# Patient Record
Sex: MALE | Race: WHITE | NOT HISPANIC OR LATINO | Employment: FULL TIME | URBAN - METROPOLITAN AREA
[De-identification: names, ages, dates, MRNs, and addresses within clinical notes are randomized per-mention and may not be internally consistent; named-entity substitution may affect disease eponyms.]

---

## 2018-01-12 ENCOUNTER — TRANSFERRED RECORDS (OUTPATIENT)
Dept: HEALTH INFORMATION MANAGEMENT | Facility: CLINIC | Age: 48
End: 2018-01-12

## 2018-03-23 ENCOUNTER — TRANSFERRED RECORDS (OUTPATIENT)
Dept: HEALTH INFORMATION MANAGEMENT | Facility: CLINIC | Age: 48
End: 2018-03-23

## 2018-07-16 ENCOUNTER — TELEPHONE (OUTPATIENT)
Dept: DERMATOLOGY | Facility: CLINIC | Age: 48
End: 2018-07-16

## 2018-07-16 NOTE — TELEPHONE ENCOUNTER
Dermatology Pre-visit Call:    Reason for visit : psoriasis     Was the patient referred: Yes    If the patient was referred, are records obtained: Will be faxed. (If no, then obtain records).    Has the patient seen a dermatologist in the past: Yes. (If yes, obtain records)    Left voicemail regarding appointment on 7/23/18 at 1830. Call back number provided for questions or rescheduling.

## 2018-07-23 ENCOUNTER — OFFICE VISIT (OUTPATIENT)
Dept: DERMATOLOGY | Facility: CLINIC | Age: 48
End: 2018-07-23
Payer: COMMERCIAL

## 2018-07-23 DIAGNOSIS — L30.9 CHRONIC DERMATITIS OF HANDS: Primary | ICD-10-CM

## 2018-07-23 RX ORDER — PIOGLITAZONEHYDROCHLORIDE 30 MG/1
TABLET ORAL
Refills: 0 | COMMUNITY
Start: 2018-06-05

## 2018-07-23 RX ORDER — SODIUM PHOSPHATE,MONO-DIBASIC 19G-7G/118
2 ENEMA (ML) RECTAL
COMMUNITY

## 2018-07-23 RX ORDER — LEVOTHYROXINE SODIUM 125 UG/1
125 TABLET ORAL
COMMUNITY

## 2018-07-23 RX ORDER — SIMVASTATIN 10 MG
10 TABLET ORAL
COMMUNITY
End: 2022-10-31

## 2018-07-23 RX ORDER — CHLORAL HYDRATE 500 MG
1000 CAPSULE ORAL
COMMUNITY

## 2018-07-23 ASSESSMENT — PAIN SCALES - GENERAL: PAINLEVEL: SEVERE PAIN (7)

## 2018-07-23 NOTE — PROGRESS NOTES
[facility] Dermatology Note      Dermatology Problem List:    Specialty Problems     None          CC:   Derm Problem (Joel is here to discuss psoriasis on his hands and feet. He states that this has been going on for years and has been getting worse. )        Encounter Date: Jul 23, 2018    History of Present Illness:  Mr. Chaitanya Davidson is a 47 year old male who presents as a referral from [place].    Past Medical History:   There is no problem list on file for this patient.    History reviewed. No pertinent past medical history.     Patient since February on light  treatment and had eximer laser treatment and with this some improvement.    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 Patient has the following hobbies or non-occupational exposure: Hunting, Fishing     reports that he has never smoked. He has never used smokeless tobacco.      Family History:  Family History   Problem Relation Age of Onset     Psoriasis Father      Skin Cancer No family hx of      Melanoma No family hx of    no allergies in family  Father had some hyperkeratotic lesions on elbows and brother some lesions on neck    Medications:  Current Outpatient Prescriptions   Medication Sig Dispense Refill     co-enzyme Q-10 30 MG CAPS capsule Take 30 mg by mouth       fish oil-omega-3 fatty acids 1000 MG capsule Take 1,000 Units by mouth       glucosamine-chondroitin 500-400 MG CAPS per capsule Take 2 capsules by mouth       levothyroxine (SYNTHROID/LEVOTHROID) 125 MCG tablet Take 125 mcg by mouth       metFORMIN (GLUCOPHAGE) 1000 MG tablet Take 1,000 mg by mouth       pioglitazone (ACTOS) 30 MG tablet TK 1 T PO QD FOR BS  0     simvastatin (ZOCOR) 10 MG tablet Take 10 mg by mouth             Review of Systems:  -As per HPI  -Constitutional: The patient denies fatigue,  fevers, chills, unintended weight loss, and night sweats.  -HEENT: Patient denies nonhealing oral sores.  -Skin: As above in HPI. No additional skin concerns.    Physical exam:  Vitals: There were no vitals taken for this visit.  GEN: This is a well developed, well-nourished male in no acute distress, in a pleasant mood.    SKIN: Focused examination of the head, extremities was performed.  No signs for psoriasis on head, extremities and nails  On hands and feet (palmar and plantar) hyperkeratosis with some inflammation on the lateral parts. Patient has shown me pictures from the hands in March 2018 with desquamation, very red skin below and according to patient blistering with clear liquid in there (no pustules). After Triamcinolone injection marked improvement.      -No other lesions of concern on areas examined.     Impression/Plan:    1) chronic hand and foot dermatitis    DDx chronic allergic contact dermatitis (chrome [leather], metals, Kathon CG, rubber?)    --> not sure about Psoriasis    ==> plan as soon as possible patch tests  --> pursue with UVB topical treatment 3x per week at home.  --> pursue with Urea cream 10%     Maybe later add to UV treatment Acitretin orally    For the patch tests patient could come in on a Monday evening to put on the tests and then from Monday evening to Friday morning patient cannot take shower and should not sweat. 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     Talked with patient about 25min    Order for PATCH TESTS      [] Inpatient / Kelly....   Bed ....  [] Outpatient    Skin Atopy           :       [] Yes   [x] No  Rhinitis/Sinusitis :       [] Yes   [x] No  Allergic Asthma   :       [] Yes   [x] No  Leg ulcers            :       [] Yes   [x] No  Hand eczema       :       [x] Yes   [] No    Leading hand :  [] R [] L               [] Ambidextrous                      Reason for tests (Suspected allergy): to metals, leather, Kathon CG, rubber?  Known previous  allergies: ...    [x] Standardized panels  [x] Standard panel (40 tests)  [x] Preservatives & Antimicrobials (31 tests)  [x] Emulsifiers & Additives (25 tests)   [] Perfumes/Flavours & Plants (25 tests)  [] Hairdresser panel (12 tests)  [] Rubber Chemicals (22 tests)  [] Plastics (26 tests)  [] Colorants/Dyes/Food additives (20 tests)  [x] Metals (implants/dental) (23 tests)  [] Local anaesthetics/NSAIDs (12 tests)  [] Antibiotics & Antimycotics (14 tests)   [] Corticosteroids (15 tests)   [] Photopatch test (32 tests)   [] others: ...    [] Patient's own products: ...    DO NOT test if chemical or biological identity is unknown!     always ask from patient the product information and safety sheets (MSDS)     [] Patient needs consultation with Allergy team  [x] Tests discussed with Allergy team (can have direct appointment for patch tests)      Follow-up for patch tests. After patch tests adjust the treatment.

## 2018-07-23 NOTE — LETTER
7/23/2018       RE: Chaitanya Davidson   [address] [place] [id number]     Dear Colleague,    Thank you for referring your patient, Chaitanya Davidson, to the [facility] DERMATOLOGY at [facility]. Please see a copy of my visit note below.    [facility] Dermatology Note      Dermatology Problem List:    Specialty Problems     None          CC:   Derm Problem (Joel is here to discuss psoriasis on his hands and feet. He states that this has been going on for years and has been getting worse. )        Encounter Date: Jul 23, 2018    History of Present Illness:  Mr. Chaitanya Davidson is a 47 year old male who presents as a referral from [place].    Past Medical History:   There is no problem list on file for this patient.    History reviewed. No pertinent past medical history.     Patient since February on light  treatment and had eximer laser treatment and with this some improvement.    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 Patient has the following hobbies or non-occupational exposure: Hunting, Fishing     reports that he has never smoked. He has never used smokeless tobacco.      Family History:  Family History   Problem Relation Age of Onset     Psoriasis Father      Skin Cancer No family hx of      Melanoma No family hx of    no allergies in family  Father had some hyperkeratotic lesions on elbows and brother some lesions on neck    Medications:  Current Outpatient Prescriptions   Medication Sig Dispense Refill     co-enzyme Q-10 30 MG CAPS capsule Take 30 mg by mouth       fish oil-omega-3 fatty acids 1000 MG capsule Take 1,000 Units by mouth       glucosamine-chondroitin 500-400 MG CAPS per capsule Take 2 capsules by mouth       levothyroxine (SYNTHROID/LEVOTHROID) 125 MCG tablet Take 125 mcg by mouth        metFORMIN (GLUCOPHAGE) 1000 MG tablet Take 1,000 mg by mouth       pioglitazone (ACTOS) 30 MG tablet TK 1 T PO QD FOR BS  0     simvastatin (ZOCOR) 10 MG tablet Take 10 mg by mouth             Review of Systems:  -As per HPI  -Constitutional: The patient denies fatigue, fevers, chills, unintended weight loss, and night sweats.  -HEENT: Patient denies nonhealing oral sores.  -Skin: As above in HPI. No additional skin concerns.    Physical exam:  Vitals: There were no vitals taken for this visit.  GEN: This is a well developed, well-nourished male in no acute distress, in a pleasant mood.    SKIN: Focused examination of the head, extremities was performed.  No signs for psoriasis on head, extremities and nails  On hands and feet (palmar and plantar) hyperkeratosis with some inflammation on the lateral parts. Patient has shown me pictures from the hands in March 2018 with desquamation, very red skin below and according to patient blistering with clear liquid in there (no pustules). After Triamcinolone injection marked improvement.      -No other lesions of concern on areas examined.     Impression/Plan:    1) chronic hand and foot dermatitis    DDx chronic allergic contact dermatitis (chrome [leather], metals, Kathon CG, rubber?)    --> not sure about Psoriasis    ==> plan as soon as possible patch tests  --> pursue with UVB topical treatment 3x per week at home.  --> pursue with Urea cream 10%     Maybe later add to UV treatment Acitretin orally    For the patch tests patient could come in on a Monday evening to put on the tests and then from Monday evening to Friday morning patient cannot take shower and should not sweat. 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     Talked with patient about 25min    Order for PATCH TESTS      - Inpatient / Kelly....   Bed ....  - Outpatient-    Skin Atopy           :       - Yes   - No  Rhinitis/Sinusitis :       - Yes   - No  Allergic Asthma   :       - Yes   -  No  Leg ulcers            :       - Yes   - No  Hand eczema       :       - Yes   - No    Leading hand :  - R - L               - Ambidextrous                      Reason for tests (Suspected allergy): to metals, leather, Kathon CG, rubber?  Known previous allergies: ...    - Standardized panels  - Standard panel (40 tests)  - Preservatives & Antimicrobials (31 tests)  - Emulsifiers & Additives (25 tests)   - Perfumes/Flavours & Plants (25 tests)  - Hairdresser panel (12 tests)  - Rubber Chemicals (22 tests)  - Plastics (26 tests)  - Colorants/Dyes/Food additives (20 tests)  - Metals (implants/dental) (23 tests)  - Local anaesthetics/NSAIDs (12 tests)  - Antibiotics & Antimycotics (14 tests)   - Corticosteroids (15 tests)   - Photopatch test (32 tests)   - others: ...    - Patient's own products: ...    DO NOT test if chemical or biological identity is unknown!     always ask from patient the product information and safety sheets (MSDS)     - Patient needs consultation with Allergy team  - Tests discussed with Allergy team (can have direct appointment for patch tests)      Follow-up for patch tests. After patch tests adjust the treatment.    Again, thank you for allowing me to participate in the care of your patient.      Sincerely,    Stuart Hyde MD

## 2018-07-23 NOTE — NURSING NOTE
Dermatology Rooming Note    Chaitanya Davidson's goals for this visit include:   Chief Complaint   Patient presents with     Derm Problem     Joel is here to discuss psoriasis on his hands and feet. He states that this has been going on for years and has been getting worse.      Aisha Cerda LPN

## 2018-07-23 NOTE — LETTER
July 23, 2018      Chaitanya Davidson   [address] [place] [id number]        To Whom It May Concern:    Chaitanya Davidson  was seen on 7/23/18.  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    Please call with any questions or concerns.         Sincerely,        Stuart Hyde MD

## 2018-07-23 NOTE — MR AVS SNAPSHOT
After Visit Summary   7/23/2018    Chaitanya Davidson    MRN: [id number]           Patient Information     Date Of Birth          1970        Visit Information        Provider Department      7/23/2018 6:30 PM Stuart Hyde MD [facility] Dermatology        Today's Diagnoses     Chronic dermatitis of hands    -  1      Care Instructions    Impression/Plan:    1) chronic hand and foot dermatitis    DDx chronic allergic contact dermatitis (chrome [leather], metals, Kathon CG, rubber?)    --> not sure about Psoriasis    ==> plan as soon as possible patch tests  --> pursue with UVB topical treatment 3x per week at home.  --> pursue with Urea cream 10%     Maybe later add to UV treatment Acitretin orally    For the patch tests patient could come in on a Monday evening to put on the tests and then from Monday evening to Friday morning patient cannot take shower and should not sweat. 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      Allergen Patch Tests    What are allergen patch tests?    Done to look for skin allergies that may be causing rashes and irritation    Three test panels with small amounts of the most common substances that cause skin allergies are taped onto your skin (usually on the back).    If you are allergic, there will be a small (about 1 inch by 1 inch) area of irritation where it was placed on your skin.    The substances are numbered, so it is easy to tell what is causing a skin reaction.     What should I do after the tests are placed?    Do not wash the test area. You may take a bath, but you may not take a shower. Keep the area dry.    Do not exercise or do activities that may cause sweating or stretch the skin.    Put on more tape if the test panels start to come off.  Leave the test panels in place for 48 hours.    You can remove the panels after 48 hours. Place the panels on wax paper or in a plastic bag.    If the black marker applied by the nurse fades, you can use a  dark pen to linn around the panel sites.    Check for redness, swelling, rash, or blisters 30 minutes after you take off the panels.    Write down the numbers of the sites that are:  o  Red, swollen, blistered, itching, or burning     Bring the test panels with you to your next doctor visit.    What can I expect?    Itching or burning at the test site may happen if you are allergic.  Do not rub or scratch. If itching or burning is not tolerable, call the doctor.    Your first follow-up visit will be 2-3 days after patch test placement. We will see you again 7 days after patch test placement.    Reactions can sometimes occur 1 to 2 weeks after the tests are applied. If this happens, call your doctor.       STOP     Stop use of systemic steroids for 1 month prior (If not possible discuss with Alergy specialist)     Stop topical steroids on the test area for 1 week prior (Okay to use elsewhere)     Stop antihistamines 2 days before test. (Until then you can take as normal)     No sunbathing or tanning kang for one month prior to test.       PATCH TEST    Patch testing is done over three appointments Monday (Allergen patches placed), Wednesday (Patches removed), and Friday (Skin examined by MD)      Test area cannot get wet until you have been seen by provider on Friday.      No showers or baths until Friday (Wash cloth okay)    Avoid activities that will cause excessive sweating while patch testing is in place (Until Friday)     Cover test area when outside to avoid any sun exposure while test is in progress. (Test areas can be on arm or back)             Follow-ups after your visit        Your next 10 appointments already scheduled     Jul 30, 2018  2:45 PM CDT   (Arrive by 2:30 PM)   Return Allergy with Stuart Hyde MD   [facility] Dermatology ([facility])    [address]  [address] [facility] [address] [phone]            Aug 01, 2018  4:30 PM CDT   (Arrive by 4:15  PM)   Return Allergy with Stuart Hyde MD   [facility]ermat[facility] ([facility])    [address]  [address] [facility] [address] [phone]            Aug 03, 2018  9:30 AM CDT   (Arrive by 9:15 AM)   Return Allergy with Stuart Hyde MD   [facility] Dermatology ([facility])    [address] [address] [phone]              Who to contact     Please call your clinic at [phone] to:    Ask questions about your health    Make or cancel appointments    Discuss your medicines    Learn about your test results    Speak to your doctor            Additional Information About Your Visit        MyChart Information     [employer] is an electronic gateway that provides easy, online access to your medical records. With [email or web address], you can request a clinic appointment, read your test results, renew a prescription or communicate with your care team.     To sign up for [employer] visit the website at [email or web address]   You will be asked to enter the access code listed below, as well as some personal information. Please follow the directions to create your username and password.     Your access code is: [email or web address]  Expires: 10/7/2018  6:31 AM     Your access code will  in 90 days. If you need help or a new code, please contact your [facility] or call [phone] for assistance.        Care EveryWhere ID     This is your Care EveryWhere ID. This could be used by other organizations to access your [place] medical records  [id number]         Blood Pressure from Last 3 Encounters:   No data found for BP    Weight from Last 3 Encounters:   No data found for Wt              Today, you had the following     No orders found for display       Primary Care Provider Fax #    Physician No Ref-Primary [phone]       No address on file        Equal Access to Services     ELAINE PUGH  AH: Luz Maria footecurtiscandido Nicoleali, wadayamida luqadaha, qaybta kajayla leigh anncristinazoraidajacklyn rico haygisele casasmarcoholli denny. So [facility] [phone].    ATENCIÓN: Si habla saranañol, tiene a farrell disposición servicios gratuitos de asistencia lingüística. Llame al [phone].    We comply with applicable federal civil rights laws and [place] laws. We do not discriminate on the basis of race, color, national origin, age, disability, sex, sexual orientation, or gender identity.            Thank you!     Thank you for choosing [facility] DERMATOLOGY  for your care. Our goal is always to provide you with excellent care. Hearing back from our patients is one way we can continue to improve our services. Please take a few minutes to complete the written survey that you may receive in the mail after your visit with us. Thank you!             Your Updated Medication List - Protect others around you: Learn how to safely use, store and throw away your medicines at www.disposemymeds.org.          This list is accurate as of 7/23/18  7:33 PM.  Always use your most recent med list.                   Brand Name Dispense Instructions for use Diagnosis    co-enzyme Q-10 30 MG Caps capsule      Take 30 mg by mouth        fish oil-omega-3 fatty acids 1000 MG capsule      Take 1,000 Units by mouth        glucosamine-chondroitin 500-400 MG Caps per capsule      Take 2 capsules by mouth        levothyroxine 125 MCG tablet    SYNTHROID/LEVOTHROID     Take 125 mcg by mouth        metFORMIN 1000 MG tablet    GLUCOPHAGE     Take 1,000 mg by mouth        pioglitazone 30 MG tablet    ACTOS     TK 1 T PO QD FOR BS        simvastatin 10 MG tablet    ZOCOR     Take 10 mg by mouth

## 2018-07-24 NOTE — PATIENT INSTRUCTIONS
Impression/Plan:    1) chronic hand and foot dermatitis    DDx chronic allergic contact dermatitis (chrome [leather], metals, Kathon CG, rubber?)    --> not sure about Psoriasis    ==> plan as soon as possible patch tests  --> pursue with UVB topical treatment 3x per week at home.  --> pursue with Urea cream 10%     Maybe later add to UV treatment Acitretin orally    For the patch tests patient could come in on a Monday evening to put on the tests and then from Monday evening to Friday morning patient cannot take shower and should not sweat. 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      Allergen Patch Tests    What are allergen patch tests?    Done to look for skin allergies that may be causing rashes and irritation    Three test panels with small amounts of the most common substances that cause skin allergies are taped onto your skin (usually on the back).    If you are allergic, there will be a small (about 1 inch by 1 inch) area of irritation where it was placed on your skin.    The substances are numbered, so it is easy to tell what is causing a skin reaction.     What should I do after the tests are placed?    Do not wash the test area. You may take a bath, but you may not take a shower. Keep the area dry.    Do not exercise or do activities that may cause sweating or stretch the skin.    Put on more tape if the test panels start to come off.  Leave the test panels in place for 48 hours.    You can remove the panels after 48 hours. Place the panels on wax paper or in a plastic bag.    If the black marker applied by the nurse fades, you can use a dark pen to linn around the panel sites.    Check for redness, swelling, rash, or blisters 30 minutes after you take off the panels.    Write down the numbers of the sites that are:  o  Red, swollen, blistered, itching, or burning     Bring the test panels with you to your next doctor visit.    What can I expect?    Itching or burning at the test site  may happen if you are allergic.  Do not rub or scratch. If itching or burning is not tolerable, call the doctor.    Your first follow-up visit will be 2-3 days after patch test placement. We will see you again 7 days after patch test placement.    Reactions can sometimes occur 1 to 2 weeks after the tests are applied. If this happens, call your doctor.       STOP     Stop use of systemic steroids for 1 month prior (If not possible discuss with Alergy specialist)     Stop topical steroids on the test area for 1 week prior (Okay to use elsewhere)     Stop antihistamines 2 days before test. (Until then you can take as normal)     No sunbathing or tanning kang for one month prior to test.       PATCH TEST    Patch testing is done over three appointments Monday (Allergen patches placed), Wednesday (Patches removed), and Friday (Skin examined by MD)      Test area cannot get wet until you have been seen by provider on Friday.      No showers or baths until Friday (Wash cloth okay)    Avoid activities that will cause excessive sweating while patch testing is in place (Until Friday)     Cover test area when outside to avoid any sun exposure while test is in progress. (Test areas can be on arm or back)

## 2018-07-30 ENCOUNTER — OFFICE VISIT (OUTPATIENT)
Dept: DERMATOLOGY | Facility: CLINIC | Age: 48
End: 2018-07-30
Payer: COMMERCIAL

## 2018-07-30 VITALS — SYSTOLIC BLOOD PRESSURE: 134 MMHG | DIASTOLIC BLOOD PRESSURE: 90 MMHG | HEART RATE: 63 BPM

## 2018-07-30 DIAGNOSIS — L23.89 ALLERGIC CONTACT DERMATITIS DUE TO OTHER AGENTS: Primary | ICD-10-CM

## 2018-07-30 ASSESSMENT — PAIN SCALES - GENERAL: PAINLEVEL: NO PAIN (0)

## 2018-07-30 NOTE — NURSING NOTE
Chief Complaint   Patient presents with     Derm Problem     Patch testing day 1        Andreina Matta, EMT

## 2018-07-30 NOTE — PROGRESS NOTES
[facility] Dermatology Note      Dermatology Problem List:    Specialty Problems     None          CC:   Derm Problem (Patch testing day 1 )        Encounter Date: Jul 30, 2018    History of Present Illness:  Mr. Chaitanya Davidson is a 47 year old male who presents as a referral from [facility].    Past Medical History:   There is no problem list on file for this patient.    History reviewed. No pertinent past medical history.     Patient since February on light  treatment and had eximer laser treatment and with this some improvement.    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 Patient has the following hobbies or non-occupational exposure: Hunting, Fishing     reports that he has never smoked. He has never used smokeless tobacco.      Family History:  Family History   Problem Relation Age of Onset     Psoriasis Father      Skin Cancer No family hx of      Melanoma No family hx of    no allergies in family  Father had some hyperkeratotic lesions on elbows and brother some lesions on neck    Medications:  Current Outpatient Prescriptions   Medication Sig Dispense Refill     co-enzyme Q-10 30 MG CAPS capsule Take 30 mg by mouth       fish oil-omega-3 fatty acids 1000 MG capsule Take 1,000 Units by mouth       glucosamine-chondroitin 500-400 MG CAPS per capsule Take 2 capsules by mouth       levothyroxine (SYNTHROID/LEVOTHROID) 125 MCG tablet Take 125 mcg by mouth       metFORMIN (GLUCOPHAGE) 1000 MG tablet Take 1,000 mg by mouth       pioglitazone (ACTOS) 30 MG tablet TK 1 T PO QD FOR BS  0     simvastatin (ZOCOR) 10 MG tablet Take 10 mg by mouth             Review of Systems:  -As per HPI  -Constitutional: The patient denies fatigue, fevers, chills, unintended weight loss, and night sweats.  -HEENT: Patient denies nonhealing oral sores.  -Skin: As  above in HPI. No additional skin concerns.    Physical exam:  Vitals: /90  Pulse 63  GEN: This is a well developed, well-nourished male in no acute distress, in a pleasant mood.    SKIN: Focused examination of the head, extremities was performed.  No signs for psoriasis on head, extremities and nails  On hands and feet (palmar and plantar) hyperkeratosis with some inflammation on the lateral parts. Patient has shown me pictures from the hands in March 2018 with desquamation, very red skin below and according to patient blistering with clear liquid in there (no pustules). After Triamcinolone injection marked improvement.      -No other lesions of concern on areas examined.     Impression/Plan:    1) chronic hand and foot dermatitis    DDx chronic allergic contact dermatitis (chrome [leather], metals, Kathon CG, rubber?)    --> not sure about Psoriasis    ==> plan as soon as possible patch tests  --> pursue with UVB topical treatment 3x per week at home.  --> pursue with Urea cream 10%     Maybe later add to UV treatment Acitretin orally    For the patch tests patient could come in on a Monday evening to put on the tests and then from Monday evening to Friday morning patient cannot take shower and should not sweat. 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     Talked with patient about 25min    Order for PATCH TESTS      [] Inpatient / [facility]....   Bed ....  [] Outpatient    Skin Atopy           :       [] Yes   [x] No  Rhinitis/Sinusitis :       [] Yes   [x] No  Allergic Asthma   :       [] Yes   [x] No  Leg ulcers            :       [] Yes   [x] No  Hand eczema       :       [x] Yes   [] No    Leading hand :  [] R [] L               [] Ambidextrous                      Reason for tests (Suspected allergy): to metals, leather, Kathon CG, rubber?  Known previous allergies: ...    [x] Standardized panels  [x] Standard panel (40 tests)  [x] Preservatives & Antimicrobials (31 tests)  [x] Emulsifiers &  Additives (25 tests)   [] Perfumes/Flavours & Plants (25 tests)  [] Hairdresser panel (12 tests)  [] Rubber Chemicals (22 tests)  [] Plastics (26 tests)  [] Colorants/Dyes/Food additives (20 tests)  [x] Metals (implants/dental) (23 tests)  [] Local anaesthetics/NSAIDs (12 tests)  [] Antibiotics & Antimycotics (14 tests)   [] Corticosteroids (15 tests)   [] Photopatch test (32 tests)   [] others: ...    [] Patient's own products: ...    DO NOT test if chemical or biological identity is unknown!     always ask from patient the product information and safety sheets (MSDS)     [] Patient needs consultation with Allergy team  [x] Tests discussed with Allergy team (can have direct appointment for patch tests)      ==> put on patch tests on 07/30/2018 around 3pm (back without lesions  - 1st reading on 08/01/2018  - 2nd reading on 08/03/2018

## 2018-07-30 NOTE — LETTER
7/30/2018       RE: Chaitanya Davidson   [address] [place] [id number]     Dear Colleague,    Thank you for referring your patient, Chaitanya Davidson, to the [facility] DERMATOLOGY at [facility]. Please see a copy of my visit note below.    [facility] Dermatology Note      Dermatology Problem List:    Specialty Problems     None          CC:   Derm Problem (Patch testing day 1 )        Encounter Date: Jul 30, 2018    History of Present Illness:  Mr. Chaitanya Davidson is a 47 year old male who presents as a referral from [facility].    Past Medical History:   There is no problem list on file for this patient.    History reviewed. No pertinent past medical history.     Patient since February on light  treatment and had eximer laser treatment and with this some improvement.    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 Patient has the following hobbies or non-occupational exposure: Hunting, Fishing     reports that he has never smoked. He has never used smokeless tobacco.      Family History:  Family History   Problem Relation Age of Onset     Psoriasis Father      Skin Cancer No family hx of      Melanoma No family hx of    no allergies in family  Father had some hyperkeratotic lesions on elbows and brother some lesions on neck    Medications:  Current Outpatient Prescriptions   Medication Sig Dispense Refill     co-enzyme Q-10 30 MG CAPS capsule Take 30 mg by mouth       fish oil-omega-3 fatty acids 1000 MG capsule Take 1,000 Units by mouth       glucosamine-chondroitin 500-400 MG CAPS per capsule Take 2 capsules by mouth       levothyroxine (SYNTHROID/LEVOTHROID) 125 MCG tablet Take 125 mcg by mouth       metFORMIN (GLUCOPHAGE) 1000 MG tablet Take 1,000 mg by mouth       pioglitazone (ACTOS) 30 MG tablet TK 1 T  PO QD FOR BS  0     simvastatin (ZOCOR) 10 MG tablet Take 10 mg by mouth             Review of Systems:  -As per HPI  -Constitutional: The patient denies fatigue, fevers, chills, unintended weight loss, and night sweats.  -HEENT: Patient denies nonhealing oral sores.  -Skin: As above in HPI. No additional skin concerns.    Physical exam:  Vitals: /90  Pulse 63  GEN: This is a well developed, well-nourished male in no acute distress, in a pleasant mood.    SKIN: Focused examination of the head, extremities was performed.  No signs for psoriasis on head, extremities and nails  On hands and feet (palmar and plantar) hyperkeratosis with some inflammation on the lateral parts. Patient has shown me pictures from the hands in March 2018 with desquamation, very red skin below and according to patient blistering with clear liquid in there (no pustules). After Triamcinolone injection marked improvement.      -No other lesions of concern on areas examined.     Impression/Plan:    1) chronic hand and foot dermatitis    DDx chronic allergic contact dermatitis (chrome [leather], metals, Kathon CG, rubber?)    --> not sure about Psoriasis    ==> plan as soon as possible patch tests  --> pursue with UVB topical treatment 3x per week at home.  --> pursue with Urea cream 10%     Maybe later add to UV treatment Acitretin orally    For the patch tests patient could come in on a Monday evening to put on the tests and then from Monday evening to Friday morning patient cannot take shower and should not sweat. 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     Talked with patient about 25min    Order for PATCH TESTS      [] Inpatient / [facility]....   Bed ....  [] Outpatient    Skin Atopy           :       [] Yes   [x] No  Rhinitis/Sinusitis :       [] Yes   [x] No  Allergic Asthma   :       [] Yes   [x] No  Leg ulcers            :       [] Yes   [x] No  Hand eczema       :       [x] Yes   [] No    Leading hand :  [] R [] L                [] Ambidextrous                      Reason for tests (Suspected allergy): to metals, leather, Kathon CG, rubber?  Known previous allergies: ...    [x] Standardized panels  [x] Standard panel (40 tests)  [x] Preservatives & Antimicrobials (31 tests)  [x] Emulsifiers & Additives (25 tests)   [] Perfumes/Flavours & Plants (25 tests)  [] Hairdresser panel (12 tests)  [] Rubber Chemicals (22 tests)  [] Plastics (26 tests)  [] Colorants/Dyes/Food additives (20 tests)  [x] Metals (implants/dental) (23 tests)  [] Local anaesthetics/NSAIDs (12 tests)  [] Antibiotics & Antimycotics (14 tests)   [] Corticosteroids (15 tests)   [] Photopatch test (32 tests)   [] others: ...    [] Patient's own products: ...    DO NOT test if chemical or biological identity is unknown!     always ask from patient the product information and safety sheets (MSDS)     [] Patient needs consultation with Allergy team  [x] Tests discussed with Allergy team (can have direct appointment for patch tests)      ==> put on patch tests on 07/30/2018 around 3pm (back without lesions  - 1st reading on 08/01/2018  - 2nd reading on 08/03/2018      Again, thank you for allowing me to participate in the care of your patient.      Sincerely,    Stuart Hyde MD

## 2018-07-30 NOTE — MR AVS SNAPSHOT
After Visit Summary   2018    Chaitanya Davidson    MRN: [id number]           Patient Information     Date Of Birth          1970        Visit Information        Provider Department      2018 2:45 PM Stuart Hyde MD [facility] Dermatology        Today's Diagnoses     Allergic contact dermatitis due to other agents    -  1       Follow-ups after your visit        Your next 10 appointments already scheduled     Aug 01, 2018  4:30 PM CDT   (Arrive by 4:15 PM)   Return Allergy with MD LORRI Jade [facility] Dermatology ([facility])    [address] [place] [id number] [phone]            Aug 03, 2018  9:30 AM CDT   (Arrive by 9:15 AM)   Return Allergy with MD LORRI Jade [facility] Dermatology ([facility])    [address] [place] [id number] [phone]              Who to contact     Please call your clinic at [phone] to:    Ask questions about your health    Make or cancel appointments    Discuss your medicines    Learn about your test results    Speak to your doctor            Additional Information About Your Visit        MyChart Information     [employer] is an electronic gateway that provides easy, online access to your medical records. With [employer], you can request a clinic appointment, read your test results, renew a prescription or communicate with your care team.     To sign up for [employer] visit the website at [email or web address]   You will be asked to enter the access code listed below, as well as some personal information. Please follow the directions to create your username and password.     Your access code is: [email or web address]  Expires: 10/7/2018  6:31 AM     Your access code will  in 90 days. If you need help or a new code, please contact your [facility] or call [phone] for assistance.        Care EveryWhere ID      This is your Care EveryWhere ID. This could be used by other organizations to access your [place] medical records  [id number]        Your Vitals Were     Pulse                   63            Blood Pressure from Last 3 Encounters:   07/30/18 134/90    Weight from Last 3 Encounters:   No data found for Wt              We Performed the Following     PATCH TESTS, EACH     PATCH TESTS, EACH        Primary Care Provider Fax #    Physician No Ref-Primary [phone]       No address on file        Equal Access to Services     ELAINE PUGH : Hadii aad ku hadasho Soomaali, waaxda luqadaha, qaybta kaalmada adeegyada, waxjacklyn radhain ginan adeajay syedefraín . So [facility] [phone].    ATENCIÓN: Si habla español, tiene a farrell disposición servicios gratuitos de asistencia lingüística. Llame al [phone].    We comply with applicable federal civil rights laws and [place] laws. We do not discriminate on the basis of race, color, national origin, age, disability, sex, sexual orientation, or gender identity.            Thank you!     Thank you for choosing [facility] DERMATOLOGY  for your care. Our goal is always to provide you with excellent care. Hearing back from our patients is one way we can continue to improve our services. Please take a few minutes to complete the written survey that you may receive in the mail after your visit with us. Thank you!             Your Updated Medication List - Protect others around you: Learn how to safely use, store and throw away your medicines at www.disposemymeds.org.          This list is accurate as of 7/30/18  5:42 PM.  Always use your most recent med list.                   Brand Name Dispense Instructions for use Diagnosis    co-enzyme Q-10 30 MG Caps capsule      Take 30 mg by mouth        fish oil-omega-3 fatty acids 1000 MG capsule      Take 1,000 Units by mouth        glucosamine-chondroitin 500-400 MG Caps per capsule      Take 2 capsules by mouth        levothyroxine 125  MCG tablet    SYNTHROID/LEVOTHROID     Take 125 mcg by mouth        metFORMIN 1000 MG tablet    GLUCOPHAGE     Take 1,000 mg by mouth        pioglitazone 30 MG tablet    ACTOS     TK 1 T PO QD FOR BS        simvastatin 10 MG tablet    ZOCOR     Take 10 mg by mouth

## 2018-07-31 NOTE — NURSING NOTE
Patient had 118 patch tests placed this visit.    Standard panel (40 tests)    Preservatives & Antimicrobials (30 tests- Missing Chloroacetamide)    Emulsifiers & Additives (25 tests)     Metals (implants/dental) (23 tests)

## 2018-08-01 ENCOUNTER — OFFICE VISIT (OUTPATIENT)
Dept: DERMATOLOGY | Facility: CLINIC | Age: 48
End: 2018-08-01
Payer: COMMERCIAL

## 2018-08-01 DIAGNOSIS — L23.89 ALLERGIC CONTACT DERMATITIS DUE TO OTHER AGENTS: Primary | ICD-10-CM

## 2018-08-01 ASSESSMENT — PAIN SCALES - GENERAL: PAINLEVEL: MILD PAIN (2)

## 2018-08-01 NOTE — MR AVS SNAPSHOT
After Visit Summary   2018    Chaitanya Davidson    MRN: [id number]           Patient Information     Date Of Birth          1970        Visit Information        Provider Department      2018 4:30 PM Stuart Hyde MD [facility] Dermatology        Today's Diagnoses     Allergic contact dermatitis due to other agents    -  1       Follow-ups after your visit        Your next 10 appointments already scheduled     Aug 01, 2018  4:30 PM CDT   (Arrive by 4:15 PM)   Return Allergy with MD LORRI Jade [facility] Dermatology ([facility])    [address] [place] [id number] [phone]            Aug 03, 2018  9:30 AM CDT   (Arrive by 9:15 AM)   Return Allergy with MD LORRI Jade [facility] Dermatology ([facility])    [address] [place] [address] [id number] [phone]              Who to contact     Please call your clinic at [phone] to:    Ask questions about your health    Make or cancel appointments    Discuss your medicines    Learn about your test results    Speak to your doctor            Additional Information About Your Visit        MyChart Information     [employer] is an electronic gateway that provides easy, online access to your medical records. With [employer], you can request a clinic appointment, read your test results, renew a prescription or communicate with your care team.     To sign up for [employer] visit the website at [email or web address]   You will be asked to enter the access code listed below, as well as some personal information. Please follow the directions to create your username and password.     Your access code is: [email or web address]  Expires: 10/7/2018  6:31 AM     Your access code will  in 90 days. If you need help or a new code, please contact your [facility] or call [phone] for assistance.        Care EveryWhere ID      This is your Care EveryWhere ID. This could be used by other organizations to access your [place] medical records  [id number]         Blood Pressure from Last 3 Encounters:   07/30/18 134/90    Weight from Last 3 Encounters:   No data found for Wt              Today, you had the following     No orders found for display       Primary Care Provider Fax #    Physician No Ref-Primary [phone]       No address on file        Equal Access to Services     [facility]: Hadii aad ku hadasho Soomaali, waaxda luqadaha, qaybta kaalmada adeegyada, waxay idiin haychuyn adeajay morel latorreyefraín . So [facility] [phone].    ATENCIÓN: Si habla español, tiene a farrell disposición servicios gratuitos de asistencia lingüística. Thomasanthony al [phone].    We comply with applicable federal civil rights laws and [place] laws. We do not discriminate on the basis of race, color, national origin, age, disability, sex, sexual orientation, or gender identity.            Thank you!     Thank you for choosing [facility] DERMATOLOGY  for your care. Our goal is always to provide you with excellent care. Hearing back from our patients is one way we can continue to improve our services. Please take a few minutes to complete the written survey that you may receive in the mail after your visit with us. Thank you!             Your Updated Medication List - Protect others around you: Learn how to safely use, store and throw away your medicines at www.disposemymeds.org.          This list is accurate as of 8/1/18  4:07 PM.  Always use your most recent med list.                   Brand Name Dispense Instructions for use Diagnosis    co-enzyme Q-10 30 MG Caps capsule      Take 30 mg by mouth        fish oil-omega-3 fatty acids 1000 MG capsule      Take 1,000 Units by mouth        glucosamine-chondroitin 500-400 MG Caps per capsule      Take 2 capsules by mouth        levothyroxine 125 MCG tablet    SYNTHROID/LEVOTHROID     Take 125 mcg by mouth         metFORMIN 1000 MG tablet    GLUCOPHAGE     Take 1,000 mg by mouth        pioglitazone 30 MG tablet    ACTOS     TK 1 T PO QD FOR BS        simvastatin 10 MG tablet    ZOCOR     Take 10 mg by mouth

## 2018-08-01 NOTE — LETTER
August 1, 2018      Chaitanya Davidson   [address] [place] [id number]        To Whom It May Concern:    Chaitanya Davidson  was seen on 7/30 - 8/3/18.  Please excuse him  until 8/6/18 due to procedures. He is able to return to work with no restrictions Monday 8/6/18.         Sincerely,        Stuart Hyde MD

## 2018-08-01 NOTE — NURSING NOTE
Dermatology Rooming Note    Chaitanya Davidson's goals for this visit include:   Chief Complaint   Patient presents with     Derm Problem     Patch testing day 3     Andreina Matta, EMT

## 2018-08-01 NOTE — LETTER
8/1/2018       RE: Chaitanya Davidson   [address] [place] [id number]     Dear Colleague,    Thank you for referring your patient, Chaitanya Davidson, to the [facility] DERMATOLOG[facility] at [facility]. Please see a copy of my visit note below.    [facility] Dermatology Note      Dermatology Problem List:    Specialty Problems     None          CC:   Derm Problem (Patch testing day 3)        Encounter Date: Aug 1, 2018    History of Present Illness:  Mr. Chaitanya Davidson is a 47 year old male who presents as a referral from [facility].    Past Medical History:   There is no problem list on file for this patient.    No past medical history on file.     Patient since February on light  treatment and had eximer laser treatment and with this some improvement.    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 Patient has the following hobbies or non-occupational exposure: Hunting, Fishing     reports that he has never smoked. He has never used smokeless tobacco.      Family History:  Family History   Problem Relation Age of Onset     Psoriasis Father      Skin Cancer No family hx of      Melanoma No family hx of    no allergies in family  Father had some hyperkeratotic lesions on elbows and brother some lesions on neck    Medications:  Current Outpatient Prescriptions   Medication Sig Dispense Refill     co-enzyme Q-10 30 MG CAPS capsule Take 30 mg by mouth       fish oil-omega-3 fatty acids 1000 MG capsule Take 1,000 Units by mouth       glucosamine-chondroitin 500-400 MG CAPS per capsule Take 2 capsules by mouth       levothyroxine (SYNTHROID/LEVOTHROID) 125 MCG tablet Take 125 mcg by mouth       metFORMIN (GLUCOPHAGE) 1000 MG tablet Take 1,000 mg by mouth       pioglitazone (ACTOS) 30 MG tablet TK 1 T PO QD FOR BS  0      simvastatin (ZOCOR) 10 MG tablet Take 10 mg by mouth             Review of Systems:  -As per HPI  -Constitutional: The patient denies fatigue, fevers, chills, unintended weight loss, and night sweats.  -HEENT: Patient denies nonhealing oral sores.  -Skin: As above in HPI. No additional skin concerns.    Physical exam:  Vitals: There were no vitals taken for this visit.  GEN: This is a well developed, well-nourished male in no acute distress, in a pleasant mood.    SKIN: Focused examination of the head, extremities was performed.  No signs for psoriasis on head, extremities and nails  On hands and feet (palmar and plantar) hyperkeratosis with some inflammation on the lateral parts. Patient has shown me pictures from the hands in March 2018 with desquamation, very red skin below and according to patient blistering with clear liquid in there (no pustules). After Triamcinolone injection marked improvement.      -No other lesions of concern on areas examined.     Impression/Plan:    1) chronic hand and foot dermatitis    DDx chronic allergic contact dermatitis (chrome [leather], metals, Kathon CG, rubber?)    --> not sure about Psoriasis    ==> plan as soon as possible patch tests  --> pursue with UVB topical treatment 3x per week at home.  --> pursue with Urea cream 10%     Maybe later add to UV treatment Acitretin orally    For the patch tests patient could come in on a Monday evening to put on the tests and then from Monday evening to Friday morning patient cannot take shower and should not sweat. 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     Talked with patient about 25min    Order for PATCH TESTS      [] Inpatient / Kelly....   Bed ....  [] Outpatient    Skin Atopy           :       [] Yes   [x] No  Rhinitis/Sinusitis :       [] Yes   [x] No  Allergic Asthma   :       [] Yes   [x] No  Leg ulcers            :       [] Yes   [x] No  Hand eczema       :       [x] Yes   [] No    Leading hand :  [] R [] L                [] Ambidextrous                      Reason for tests (Suspected allergy): to metals, leather, Kathon CG, rubber?  Known previous allergies: ...    [x] Standardized panels  [x] Standard panel (40 tests)  [x] Preservatives & Antimicrobials (31 tests)  [x] Emulsifiers & Additives (25 tests)   [] Perfumes/Flavours & Plants (25 tests)  [] Hairdresser panel (12 tests)  [] Rubber Chemicals (22 tests)  [] Plastics (26 tests)  [] Colorants/Dyes/Food additives (20 tests)  [x] Metals (implants/dental) (23 tests)  [] Local anaesthetics/NSAIDs (12 tests)  [] Antibiotics & Antimycotics (14 tests)   [] Corticosteroids (15 tests)   [] Photopatch test (32 tests)   [] others: ...    [] Patient's own products: ...    DO NOT test if chemical or biological identity is unknown!     always ask from patient the product information and safety sheets (MSDS)     [] Patient needs consultation with Allergy team  [x] Tests discussed with Allergy team (can have direct appointment for patch tests)      ==> put on patch tests on 07/30/2018 around 3pm (back without lesions  - 1st reading on 08/01/2018: all patches on site; until now no significant reaction. (+) Glururaldehyde and (+) PVP Iodine  - 2nd reading on 08/03/2018    RESULTS & EVALUATION of PATCH TESTS      Patch test readings after     [x] 2 days, [] 3 days [] 4 days, [] 5 days,   Other duration: ...  [] No relevant allergic reaction observed    [] Allergic reaction diagnosed against: ......      Interpretation/ remarks:   ...    [] Patient information given   [] ACSD information   [] SmartPractice information    [] Treatment prescribed: ...            Again, thank you for allowing me to participate in the care of your patient.      Sincerely,    Stuart Hyde MD

## 2018-08-01 NOTE — PROGRESS NOTES
[facility] Dermatology Note      Dermatology Problem List:    Specialty Problems     None          CC:   Derm Problem (Patch testing day 3)        Encounter Date: Aug 1, 2018    History of Present Illness:  Mr. Chaitanya Davidson is a 47 year old male who presents as a referral from [facility].    Past Medical History:   There is no problem list on file for this patient.    No past medical history on file.     Patient since February on light  treatment and had eximer laser treatment and with this some improvement.    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 Patient has the following hobbies or non-occupational exposure: Hunting, Fishing     reports that he has never smoked. He has never used smokeless tobacco.      Family History:  Family History   Problem Relation Age of Onset     Psoriasis Father      Skin Cancer No family hx of      Melanoma No family hx of    no allergies in family  Father had some hyperkeratotic lesions on elbows and brother some lesions on neck    Medications:  Current Outpatient Prescriptions   Medication Sig Dispense Refill     co-enzyme Q-10 30 MG CAPS capsule Take 30 mg by mouth       fish oil-omega-3 fatty acids 1000 MG capsule Take 1,000 Units by mouth       glucosamine-chondroitin 500-400 MG CAPS per capsule Take 2 capsules by mouth       levothyroxine (SYNTHROID/LEVOTHROID) 125 MCG tablet Take 125 mcg by mouth       metFORMIN (GLUCOPHAGE) 1000 MG tablet Take 1,000 mg by mouth       pioglitazone (ACTOS) 30 MG tablet TK 1 T PO QD FOR BS  0     simvastatin (ZOCOR) 10 MG tablet Take 10 mg by mouth             Review of Systems:  -As per HPI  -Constitutional: The patient denies fatigue, fevers, chills, unintended weight loss, and night sweats.  -HEENT: Patient denies nonhealing oral sores.  -Skin: As above in HPI. No  additional skin concerns.    Physical exam:  Vitals: There were no vitals taken for this visit.  GEN: This is a well developed, well-nourished male in no acute distress, in a pleasant mood.    SKIN: Focused examination of the head, extremities was performed.  No signs for psoriasis on head, extremities and nails  On hands and feet (palmar and plantar) hyperkeratosis with some inflammation on the lateral parts. Patient has shown me pictures from the hands in March 2018 with desquamation, very red skin below and according to patient blistering with clear liquid in there (no pustules). After Triamcinolone injection marked improvement.      -No other lesions of concern on areas examined.     Impression/Plan:    1) chronic hand and foot dermatitis    DDx chronic allergic contact dermatitis (chrome [leather], metals, Kathon CG, rubber?)    --> not sure about Psoriasis    ==> plan as soon as possible patch tests  --> pursue with UVB topical treatment 3x per week at home.  --> pursue with Urea cream 10%     Maybe later add to UV treatment Acitretin orally    For the patch tests patient could come in on a Monday evening to put on the tests and then from Monday evening to Friday morning patient cannot take shower and should not sweat. 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     Talked with patient about 25min    Order for PATCH TESTS      [] Inpatient / Kelly....   Bed ....  [] Outpatient    Skin Atopy           :       [] Yes   [x] No  Rhinitis/Sinusitis :       [] Yes   [x] No  Allergic Asthma   :       [] Yes   [x] No  Leg ulcers            :       [] Yes   [x] No  Hand eczema       :       [x] Yes   [] No    Leading hand :  [] R [] L               [] Ambidextrous                      Reason for tests (Suspected allergy): to metals, leather, Kathon CG, rubber?  Known previous allergies: ...    [x] Standardized panels  [x] Standard panel (40 tests)  [x] Preservatives & Antimicrobials (31 tests)  [x]  Emulsifiers & Additives (25 tests)   [] Perfumes/Flavours & Plants (25 tests)  [] Hairdresser panel (12 tests)  [] Rubber Chemicals (22 tests)  [] Plastics (26 tests)  [] Colorants/Dyes/Food additives (20 tests)  [x] Metals (implants/dental) (23 tests)  [] Local anaesthetics/NSAIDs (12 tests)  [] Antibiotics & Antimycotics (14 tests)   [] Corticosteroids (15 tests)   [] Photopatch test (32 tests)   [] others: ...    [] Patient's own products: ...    DO NOT test if chemical or biological identity is unknown!     always ask from patient the product information and safety sheets (MSDS)     [] Patient needs consultation with Allergy team  [x] Tests discussed with Allergy team (can have direct appointment for patch tests)      ==> put on patch tests on 07/30/2018 around 3pm (back without lesions  - 1st reading on 08/01/2018: all patches on site; until now no significant reaction. (+) Glururaldehyde and (+) PVP Iodine  - 2nd reading on 08/03/2018    RESULTS & EVALUATION of PATCH TESTS      Patch test readings after     [x] 2 days, [] 3 days [] 4 days, [] 5 days,   Other duration: ...  [] No relevant allergic reaction observed    [] Allergic reaction diagnosed against: ......      Interpretation/ remarks:   ...    [] Patient information given   [] ACSD information   [] SmartPractice information    [] Treatment prescribed: ...

## 2018-08-03 ENCOUNTER — OFFICE VISIT (OUTPATIENT)
Dept: DERMATOLOGY | Facility: CLINIC | Age: 48
End: 2018-08-03
Payer: COMMERCIAL

## 2018-08-03 DIAGNOSIS — L40.9 PSORIASIS: Primary | ICD-10-CM

## 2018-08-03 RX ORDER — ACITRETIN 25 MG/1
CAPSULE ORAL
Qty: 30 CAPSULE | Refills: 3 | Status: ON HOLD | OUTPATIENT
Start: 2018-08-03 | End: 2022-10-31

## 2018-08-03 RX ORDER — UREA 8.5 G/85G
CREAM TOPICAL PRN
Qty: 410 G | Refills: 1 | Status: SHIPPED | OUTPATIENT
Start: 2018-08-03

## 2018-08-03 ASSESSMENT — PAIN SCALES - GENERAL: PAINLEVEL: NO PAIN (0)

## 2018-08-03 NOTE — MR AVS SNAPSHOT
After Visit Summary   8/3/2018    Chaitanya Davidson    MRN: [id number]           Patient Information     Date Of Birth          1970        Visit Information        Provider Department      8/3/2018 9:30 AM Stuart Hyde MD [facility] Dermatology        Today's Diagnoses     Psoriasis    -  1      Care Instructions    Diagnosis:    ==> chronic hand and foot dermatitis chronic toxic/irritant dermatitis (DDx Psoriasis)    No allergic contact dermatitis found in patch tests    [x] Treatment prescribed:     --> pursue with UVB topical treatment 3x per week at home (reduce by 50% after adding the Acitretine)  --> Acitretine 25mg every evening   --> Urea cream 10%           Follow-ups after your visit        Your next 10 appointments already scheduled     Aug 29, 2018  6:45 PM CDT   (Arrive by 6:30 PM)   Return Allergy with Stuart Hyde MD   [facility] ([facility])    [address] [place] [id number] [phone]              Who to contact     Please call your clinic at [phone] to:    Ask questions about your health    Make or cancel appointments    Discuss your medicines    Learn about your test results    Speak to your doctor            Additional Information About Your Visit        MyChart Information     [employer] is an electronic gateway that provides easy, online access to your medical records. With [employer], you can request a clinic appointment, read your test results, renew a prescription or communicate with your care team.     To sign up for [employer] visit the website at [email or web address]   You will be asked to enter the access code listed below, as well as some personal information. Please follow the directions to create your username and password.     Your access code is: [email or web address]  Expires: 10/7/2018  6:31 AM     Your access code will  in 90 days. If you need help or a new code, please contact your  [facility] or call [phone] for assistance.        Care EveryWhere ID     This is your Care EveryWhere ID. This could be used by other organizations to access your [place] medical records  [id number]         Blood Pressure from Last 3 Encounters:   07/30/18 134/90    Weight from Last 3 Encounters:   No data found for Wt              Today, you had the following     No orders found for display         Today's Medication Changes          These changes are accurate as of 8/3/18 10:18 AM.  If you have any questions, ask your nurse or doctor.               Start taking these medicines.        Dose/Directions    acitretin 25 MG Caps capsule   Commonly known as:  SORIATANE   Used for:  Psoriasis   Started by:  Stuart Hyde MD        Take as directed   Quantity:  30 capsule   Refills:  3       urea 10 % cream   Commonly known as:  CARMOL   Used for:  Psoriasis   Started by:  Stuart Hyde MD        Apply topically as needed for dry skin Put on hands and feet morning and evening   Quantity:  410 g   Refills:  1            Where to get your medicines      These medications were sent to [facility] [address] [place] [address] [address] [facility]  [address] [facility] [address]     Phone:  [phone]     acitretin 25 MG Caps capsule    urea 10 % cream                Primary Care Provider Fax #    Physician No Ref-Primary [phone]       No address on file        Equal Access to Services     ELAINE PUGH AH: Luz Maria ramey Soamy, waaxda luqadaha, qaybta kaalmada monica, robert denny. So [facility] [phone].    ATENCIÓN: Si habla español, tiene a farrell disposición servicios gratuitos de asistencia lingüística. Llanthony al [phone].    We comply with applicable federal civil rights laws and [place] laws. We do not discriminate on the basis of race, color, national origin, age, disability, sex, sexual  orientation, or gender identity.            Thank you!     Thank you for choosing [facility] DERMATOLOGY  for your care. Our goal is always to provide you with excellent care. Hearing back from our patients is one way we can continue to improve our services. Please take a few minutes to complete the written survey that you may receive in the mail after your visit with us. Thank you!             Your Updated Medication List - Protect others around you: Learn how to safely use, store and throw away your medicines at www.disposemymeds.org.          This list is accurate as of 8/3/18 10:18 AM.  Always use your most recent med list.                   Brand Name Dispense Instructions for use Diagnosis    acitretin 25 MG Caps capsule    SORIATANE    30 capsule    Take as directed    Psoriasis       co-enzyme Q-10 30 MG Caps capsule      Take 30 mg by mouth        fish oil-omega-3 fatty acids 1000 MG capsule      Take 1,000 Units by mouth        glucosamine-chondroitin 500-400 MG Caps per capsule      Take 2 capsules by mouth        levothyroxine 125 MCG tablet    SYNTHROID/LEVOTHROID     Take 125 mcg by mouth        metFORMIN 1000 MG tablet    GLUCOPHAGE     Take 1,000 mg by mouth        pioglitazone 30 MG tablet    ACTOS     TK 1 T PO QD FOR BS        simvastatin 10 MG tablet    ZOCOR     Take 10 mg by mouth        urea 10 % cream    CARMOL    410 g    Apply topically as needed for dry skin Put on hands and feet morning and evening    Psoriasis

## 2018-08-03 NOTE — PROGRESS NOTES
[facility] Dermatology Note      Dermatology Problem List:    Specialty Problems     None          CC:   Derm Problem (patch testing day 5)        Encounter Date: Aug 3, 2018    History of Present Illness:  Mr. Chaitanya Davidson is a 47 year old male who presents as a referral from [facility].    Past Medical History:   There is no problem list on file for this patient.    History reviewed. No pertinent past medical history.     Patient since February on light  treatment and had eximer laser treatment and with this some improvement.    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 Patient has the following hobbies or non-occupational exposure: Hunting, Fishing     reports that he has never smoked. He has never used smokeless tobacco.      Family History:  Family History   Problem Relation Age of Onset     Psoriasis Father      Skin Cancer No family hx of      Melanoma No family hx of    no allergies in family  Father had some hyperkeratotic lesions on elbows and brother some lesions on neck    Medications:  Current Outpatient Prescriptions   Medication Sig Dispense Refill     co-enzyme Q-10 30 MG CAPS capsule Take 30 mg by mouth       fish oil-omega-3 fatty acids 1000 MG capsule Take 1,000 Units by mouth       glucosamine-chondroitin 500-400 MG CAPS per capsule Take 2 capsules by mouth       levothyroxine (SYNTHROID/LEVOTHROID) 125 MCG tablet Take 125 mcg by mouth       metFORMIN (GLUCOPHAGE) 1000 MG tablet Take 1,000 mg by mouth       pioglitazone (ACTOS) 30 MG tablet TK 1 T PO QD FOR BS  0     simvastatin (ZOCOR) 10 MG tablet Take 10 mg by mouth             Review of Systems:  -As per HPI  -Constitutional: The patient denies fatigue, fevers, chills, unintended weight loss, and night sweats.  -HEENT: Patient denies nonhealing oral sores.  -Skin: As  above in HPI. No additional skin concerns.    Physical exam:  Vitals: There were no vitals taken for this visit.  GEN: This is a well developed, well-nourished male in no acute distress, in a pleasant mood.    SKIN: Focused examination of the head, extremities was performed.  No signs for psoriasis on head, extremities and nails  On hands and feet (palmar and plantar) hyperkeratosis with some inflammation on the lateral parts. Patient has shown me pictures from the hands in March 2018 with desquamation, very red skin below and according to patient blistering with clear liquid in there (no pustules). After Triamcinolone injection marked improvement.    -No other lesions of concern on areas examined.     Order for PATCH TESTS      [] Inpatient / Kelly....   Bed ....  [] Outpatient    Skin Atopy           :       [] Yes   [x] No  Rhinitis/Sinusitis :       [] Yes   [x] No  Allergic Asthma   :       [] Yes   [x] No  Leg ulcers            :       [] Yes   [x] No  Hand eczema       :       [x] Yes   [] No    Leading hand :  [] R [] L               [] Ambidextrous                      Reason for tests (Suspected allergy): to metals, leather, Kathon CG, rubber?  Known previous allergies: ...    [x] Standardized panels  [x] Standard panel (40 tests)  [x] Preservatives & Antimicrobials (31 tests)  [x] Emulsifiers & Additives (25 tests)   [] Perfumes/Flavours & Plants (25 tests)  [] Hairdresser panel (12 tests)  [] Rubber Chemicals (22 tests)  [] Plastics (26 tests)  [] Colorants/Dyes/Food additives (20 tests)  [x] Metals (implants/dental) (23 tests)  [] Local anaesthetics/NSAIDs (12 tests)  [] Antibiotics & Antimycotics (14 tests)   [] Corticosteroids (15 tests)   [] Photopatch test (32 tests)   [] others: ...    [] Patient's own products: ...    DO NOT test if chemical or biological identity is unknown!     always ask from patient the product information and safety sheets (MSDS)     [] Patient needs consultation with Allergy  team  [x] Tests discussed with Allergy team (can have direct appointment for patch tests)      ==> put on patch tests on 07/30/2018 around 3pm (back without lesions  - 1st reading on 08/01/2018: all patches on site; until now no significant reaction. (+) Glururaldehyde and (+) PVP Iodine  - 2nd reading o n 08/03/2018: all patch tests negatives    RESULTS & EVALUATION of PATCH TESTS      Patch test readings after     [x] 2 days, [] 3 days [x] 4 days, [] 5 days,   Other duration: ...  [x] No relevant allergic reaction observed      Interpretation/ remarks:   No relevant contact allergy found, especially not against the metals.     Diagnosis:    ==> chronic hand and foot dermatitis chronic toxic/irritant dermatitis (DDx Psoriasis)    No allergic contact dermatitis found in patch tests    [x] Treatment prescribed:     --> pursue with UVB topical treatment 3x per week at home (reduce by 50% after adding the Acitretine)  --> Acitretine 25mg every evening   --> Urea cream 10%     See patient in 3 weeks and then do blood tests liver and lipids (had check up with GP 2 months ago ==> all OK according to patient)

## 2018-08-03 NOTE — LETTER
8/3/2018       RE: Chaitanya Davidson   [address] [place] [id number]     Dear Colleague,    Thank you for referring your patient, Chaitanya Davidson, to the [facility] DERMATOLOGY at [facility]. Please see a copy of my visit note below.    [facility] Dermatology Note      Dermatology Problem List:    Specialty Problems     None        CC:   Derm Problem (patch testing day 5)    Encounter Date: Aug 3, 2018    History of Present Illness:  Mr. Chaitanya Davidson is a 47 year old male who presents as a referral from [place].    Past Medical History:   There is no problem list on file for this patient.    History reviewed. No pertinent past medical history.     Patient since February on light  treatment and had eximer laser treatment and with this some improvement.    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 Patient has the following hobbies or non-occupational exposure: Hunting, Fishing     reports that he has never smoked. He has never used smokeless tobacco.      Family History:  Family History   Problem Relation Age of Onset     Psoriasis Father      Skin Cancer No family hx of      Melanoma No family hx of    no allergies in family  Father had some hyperkeratotic lesions on elbows and brother some lesions on neck    Medications:  Current Outpatient Prescriptions   Medication Sig Dispense Refill     co-enzyme Q-10 30 MG CAPS capsule Take 30 mg by mouth       fish oil-omega-3 fatty acids 1000 MG capsule Take 1,000 Units by mouth       glucosamine-chondroitin 500-400 MG CAPS per capsule Take 2 capsules by mouth       levothyroxine (SYNTHROID/LEVOTHROID) 125 MCG tablet Take 125 mcg by mouth       metFORMIN (GLUCOPHAGE) 1000 MG tablet Take 1,000 mg by mouth       pioglitazone (ACTOS) 30 MG tablet TK 1 T PO QD FOR  BS  0     simvastatin (ZOCOR) 10 MG tablet Take 10 mg by mouth             Review of Systems:  -As per HPI  -Constitutional: The patient denies fatigue, fevers, chills, unintended weight loss, and night sweats.  -HEENT: Patient denies nonhealing oral sores.  -Skin: As above in HPI. No additional skin concerns.    Physical exam:  Vitals: There were no vitals taken for this visit.  GEN: This is a well developed, well-nourished male in no acute distress, in a pleasant mood.    SKIN: Focused examination of the head, extremities was performed.  No signs for psoriasis on head, extremities and nails  On hands and feet (palmar and plantar) hyperkeratosis with some inflammation on the lateral parts. Patient has shown me pictures from the hands in March 2018 with desquamation, very red skin below and according to patient blistering with clear liquid in there (no pustules). After Triamcinolone injection marked improvement.    -No other lesions of concern on areas examined.     Order for PATCH TESTS      [] Inpatient / Kelly....   Bed ....  [] Outpatient    Skin Atopy           :       [] Yes   [x] No  Rhinitis/Sinusitis :       [] Yes   [x] No  Allergic Asthma   :       [] Yes   [x] No  Leg ulcers            :       [] Yes   [x] No  Hand eczema       :       [x] Yes   [] No    Leading hand :  [] R [] L               [] Ambidextrous                      Reason for tests (Suspected allergy): to metals, leather, Kathon CG, rubber?  Known previous allergies: ...    [x] Standardized panels  [x] Standard panel (40 tests)  [x] Preservatives & Antimicrobials (31 tests)  [x] Emulsifiers & Additives (25 tests)   [] Perfumes/Flavours & Plants (25 tests)  [] Hairdresser panel (12 tests)  [] Rubber Chemicals (22 tests)  [] Plastics (26 tests)  [] Colorants/Dyes/Food additives (20 tests)  [x] Metals (implants/dental) (23 tests)  [] Local anaesthetics/NSAIDs (12 tests)  [] Antibiotics & Antimycotics (14 tests)   [] Corticosteroids (15 tests)    [] Photopatch test (32 tests)   [] others: ...    [] Patient's own products: ...    DO NOT test if chemical or biological identity is unknown!     always ask from patient the product information and safety sheets (MSDS)     [] Patient needs consultation with Allergy team  [x] Tests discussed with Allergy team (can have direct appointment for patch tests)      ==> put on patch tests on 07/30/2018 around 3pm (back without lesions  - 1st reading on 08/01/2018: all patches on site; until now no significant reaction. (+) Glururaldehyde and (+) PVP Iodine  - 2nd reading o n 08/03/2018: all patch tests negatives    RESULTS & EVALUATION of PATCH TESTS      Patch test readings after     [x] 2 days, [] 3 days [x] 4 days, [] 5 days,   Other duration: ...  [x] No relevant allergic reaction observed      Interpretation/ remarks:   No relevant contact allergy found, especially not against the metals.     Diagnosis:    ==> chronic hand and foot dermatitis chronic toxic/irritant dermatitis (DDx Psoriasis)    No allergic contact dermatitis found in patch tests    [x] Treatment prescribed:     --> pursue with UVB topical treatment 3x per week at home (reduce by 50% after adding the Acitretine)  --> Acitretine 25mg every evening   --> Urea cream 10%     See patient in 3 weeks and then do blood tests liver and lipids (had check up with GP 2 months ago ==> all OK according to patient)              Date/time of application: 8/30/2018  Physician/Nurse: / Aisha Cerda LPN   Localization of application: Back    STANDARD Series         No Substance 3 days 5 days remarks   1 OTIS mix (benzocaine, cinchocain, procain) - -    2 Colophony - -    3  2-mercaptobenzothiazole  - -     4 Methylisothiazolinone - -    5 CARBA mix - -    6 THIURAM mix - -    7 Bisphenol A Epoxy resin - -    8 m-hvpi-jvadlqondkc-formaldehyde resin - -    9 MERCAPTO mix - -    10 BLACK RUBBER mix - -    11 Potassium DICHROMATE  -  -    12 Myroxylon  pereirae resin (balsam of [place]) - -    13 Nickel (II) sulphate, 6H2O - -    14 Mixed dialkyl thiourea - -    15 PARABEN mix - -    16 Methyldibromo glutaro-nitrile/phenoxyethanole - -    17 FRAGRANCE I mix - -    18 Bronopol (2-Bromo-2-nitropropane-1,3-diol) - -    19 Lyral - -    20 Tixocortol-21- pivalate - -    21 Diazolidiyl urea (germall II; Formaldehyde releaser) - -    22 Methyl methacrylate - -    23 Cobalt (II) chloride, 6H2O - -    24 FRAGRANCE II mix - -    25 COMPOSITAE mix - -    26 Benzoylperoxide - -    27 Bacitracin - -    28 Formaldehyde - -    29 Methylchloroisothiazolinone/Methylisothiazolinone (Kathon CG) - -    30 CORTICOSTEROID mix - -    31 Sodium Lauryl Sulfate - -    32 Lanolin alcohol - -    33 Turpentine - -    34 Cetylstearyl alcohol - -    35 Chlorhexidine dicluconate - -    36 Budenoside - -    37 Imidazolidinyl Urea (Germall 115; Formaldehyde releaser) - -    38 Ethyl-2 Cyanoacrylate - -    39 Quaternium 15 (Dowicil 200) - -    40 Decyl Glucoside - -      Remarks:                    EMULSIFIERS & ADDITIVES        No Substance 2 days 4 days remarks   41 Polyethylene glycol-400 - -    42 Cocamidopropylbetaine - -    43 Amerchol L101 - -    44 Propyleneglycol - -    45 Triethanolamine - -    46 Sorbitane Sesquiolate - -    47 Isopropylmyristate - -    48 Polysorbate 80 (Tween 80) - -    49 Amidoamine (stearamido-propyl dimethylamine) - -    50 Oleamidopropyl dimethylamine - -    51 Lauryl Glucoside - -    52 Coconut diethanolamide (cocamide NATHANAEL) - -    53 2-hydroxy-4methoxy-benzo-phenone (sunscreen) - -    54 Benzophenone-4 (sunscr) - -    55 Propolis - -    56 Dexpanthenol - -    57 Carboxymethylcellulose sodium - -    58 Abitol - -    59 tert-butylhydroquinone - -    60 Benzyl salicylate (sunscreen)  - -     antioxidant      61 Dodecyl gallate - -    62 Butylhydroxyanisole (BHA) - -    63 Butylhydroxytoluene (BHT) - -    64 di-a-Tocopherol (Vit E) - -    65 Propyl gallate - -       Remarks:            PRESERVATIVES & ANTIMICROBIALS         No Substance 2 days 4 days remarks   66  1,2-benzisothiazoline-3-one, sodium salt - -    67  1,3,5-carlos(2-hydroxyethyl) -hexahydrotriazine(Grotan BK) - -    68  9-krloapkgebsnf-4-nitro-1,3-propanediol - -    69  3,4,4'-triclocarban - -    70 4-Chloro-3-cresol (PCMC) - -    71 4-Chloro-4-xylénol (PCMX) - -    72 7-ethylbicyclooxazolidine (biopan CS 1246) - -    73 Benzalkonium chloride - -    74 Benzyl alcohol - -    75 Cetalkonium chloride - -    76 Cetylpyrimidine chloride  - -    78 DMDM Hydantoin - -    79 Glutaraldehyde (+) -    80 Triclosan - -    81 Glyoxal trimeric dihydrate - -    82 Iodopropynyl butylcarbamate - -    83 2-n-Octyl-4-isothiazolin-3-one - -    84 Iodoform - -    85 (Nitrobutyl)morpholine/(Ethylnitrotrimethylene) dimorpholine(Biopan ) - -    86 Phenoxyethanol - -    87 Phenylsalicylate - -    88 Povidone Iodine (+) -    89 Sodium Benzoate - -    90 Sodium Disulfite - -    91 Sorbic Acid - -    92 Thimerosal - -     Parabens      93 butyl-p-hydroxybenzoate - -    94 ethyl-p-hydroxybenzoate - -    95 methyl-p-hydroxybenzoate - -    96 Propyl-p-hydroxybenzoate - -        Remarks:          METALS (implants/dental)         No Substance 2 days 4 days remarks   97 Ammonium heptamolybdate - -    98 Ammoniumtetrachloroplatinate (II) - -    99 Indium (III) chloride - -    100 Iridium (III) chloride - -    101 Iron chloride - -    102 Manganese chloride - -    103 Niobium chloride - -    104 Palladium chloride - -    105 Silver nitrate - -    106 Sodium thiosulfatoaureate (gold) - -    107 Tantal - -    108 Tin (II) chloride - -    109 Titanium (IV) Oxide - -    110 Titanium - -    111 Tungstic acid (sod salt dihydrat) - -    112 Vanadium pentoxide - -    113 Wolfram - -    114 Zinc chloride - -    115 Zirconium (IV) oxide - -    116 Mercury (II) Amidochloride - -    117 Copper sulfate pentahydrate - -    118 Amalgam (hg, ag, zn, cu, sn)  - -    119 Aluminum hydroxide - -      Remarks:                  Results of patch tests:                         Interpretation:    - Negative                    A    = Allergic      (+) Erythema    TI   = Toxic/irritant   + E + Infiltration    RaP = Relevance at Present     ++ E/I + Papulovesicle   Rpr  = Relevance Previously     +++ E/I/P + Blister     nR   = No Relevance      Again, thank you for allowing me to participate in the care of your patient.      Sincerely,    Stuart Hyde MD

## 2018-08-03 NOTE — PROGRESS NOTES
Date/time of application: 8/30/2018  Physician/Nurse: / Aisha Cerda LPN   Localization of application: Back    STANDARD Series         No Substance 3 days 5 days remarks   1 OTIS mix (benzocaine, cinchocain, procain) - -    2 Colophony - -    3  2-mercaptobenzothiazole  - -     4 Methylisothiazolinone - -    5 CARBA mix - -    6 THIURAM mix - -    7 Bisphenol A Epoxy resin - -    8 u-iwja-onkshgzefil-formaldehyde resin - -    9 MERCAPTO mix - -    10 BLACK RUBBER mix - -    11 Potassium DICHROMATE  -  -    12 Myroxylon pereirae resin (balsam of [place]) - -    13 Nickel (II) sulphate, 6H2O - -    14 Mixed dialkyl thiourea - -    15 PARABEN mix - -    16 Methyldibromo glutaro-nitrile/phenoxyethanole - -    17 FRAGRANCE I mix - -    18 Bronopol (2-Bromo-2-nitropropane-1,3-diol) - -    19 Lyral - -    20 Tixocortol-21- pivalate - -    21 Diazolidiyl urea (germall II; Formaldehyde releaser) - -    22 Methyl methacrylate - -    23 Cobalt (II) chloride, 6H2O - -    24 FRAGRANCE II mix - -    25 COMPOSITAE mix - -    26 Benzoylperoxide - -    27 Bacitracin - -    28 Formaldehyde - -    29 Methylchloroisothiazolinone/Methylisothiazolinone (Kathon CG) - -    30 CORTICOSTEROID mix - -    31 Sodium Lauryl Sulfate - -    32 Lanolin alcohol - -    33 Turpentine - -    34 Cetylstearyl alcohol - -    35 Chlorhexidine dicluconate - -    36 Budenoside - -    37 Imidazolidinyl Urea (Germall 115; Formaldehyde releaser) - -    38 Ethyl-2 Cyanoacrylate - -    39 Quaternium 15 (Dowicil 200) - -    40 Decyl Glucoside - -      Remarks:                    EMULSIFIERS & ADDITIVES        No Substance 2 days 4 days remarks   41 Polyethylene glycol-400 - -    42 Cocamidopropylbetaine - -    43 Amerchol L101 - -    44 Propyleneglycol - -    45 Triethanolamine - -    46 Sorbitane Sesquiolate - -    47 Isopropylmyristate - -    48 Polysorbate 80 (Tween 80) - -    49 Amidoamine (stearamido-propyl dimethylamine) - -    50  Oleamidopropyl dimethylamine - -    51 Lauryl Glucoside - -    52 Coconut diethanolamide (cocamide NATHANAEL) - -    53 2-hydroxy-4methoxy-benzo-phenone (sunscreen) - -    54 Benzophenone-4 (sunscr) - -    55 Propolis - -    56 Dexpanthenol - -    57 Carboxymethylcellulose sodium - -    58 Abitol - -    59 tert-butylhydroquinone - -    60 Benzyl salicylate (sunscreen)  - -     antioxidant      61 Dodecyl gallate - -    62 Butylhydroxyanisole (BHA) - -    63 Butylhydroxytoluene (BHT) - -    64 di-a-Tocopherol (Vit E) - -    65 Propyl gallate - -      Remarks:            PRESERVATIVES & ANTIMICROBIALS         No Substance 2 days 4 days remarks   66  1,2-benzisothiazoline-3-one, sodium salt - -    67  1,3,5-carlos(2-hydroxyethyl) -hexahydrotriazine(Grotan BK) - -    68  1-mcsajzcnlemfc-0-nitro-1,3-propanediol - -    69  3,4,4'-triclocarban - -    70 4-Chloro-3-cresol (PCMC) - -    71 4-Chloro-4-xylénol (PCMX) - -    72 7-ethylbicyclooxazolidine (biopan CS 1246) - -    73 Benzalkonium chloride - -    74 Benzyl alcohol - -    75 Cetalkonium chloride - -    76 Cetylpyrimidine chloride  - -    78 DMDM Hydantoin - -    79 Glutaraldehyde (+) -    80 Triclosan - -    81 Glyoxal trimeric dihydrate - -    82 Iodopropynyl butylcarbamate - -    83 2-n-Octyl-4-isothiazolin-3-one - -    84 Iodoform - -    85 (Nitrobutyl)morpholine/(Ethylnitrotrimethylene) dimorpholine(Biopan ) - -    86 Phenoxyethanol - -    87 Phenylsalicylate - -    88 Povidone Iodine (+) -    89 Sodium Benzoate - -    90 Sodium Disulfite - -    91 Sorbic Acid - -    92 Thimerosal - -     Parabens      93 butyl-p-hydroxybenzoate - -    94 ethyl-p-hydroxybenzoate - -    95 methyl-p-hydroxybenzoate - -    96 Propyl-p-hydroxybenzoate - -        Remarks:          METALS (implants/dental)         No Substance 2 days 4 days remarks   97 Ammonium heptamolybdate - -    98 Ammoniumtetrachloroplatinate (II) - -    99 Indium (III) chloride - -    100 Iridium (III) chloride - -     101 Iron chloride - -    102 Manganese chloride - -    103 Niobium chloride - -    104 Palladium chloride - -    105 Silver nitrate - -    106 Sodium thiosulfatoaureate (gold) - -    107 Tantal - -    108 Tin (II) chloride - -    109 Titanium (IV) Oxide - -    110 Titanium - -    111 Tungstic acid (sod salt dihydrat) - -    112 Vanadium pentoxide - -    113 Wolfram - -    114 Zinc chloride - -    115 Zirconium (IV) oxide - -    116 Mercury (II) Amidochloride - -    117 Copper sulfate pentahydrate - -    118 Amalgam (hg, ag, zn, cu, sn) - -    119 Aluminum hydroxide - -      Remarks:                  Results of patch tests:                         Interpretation:    - Negative                    A    = Allergic      (+) Erythema    TI   = Toxic/irritant   + E + Infiltration    RaP = Relevance at Present     ++ E/I + Papulovesicle   Rpr  = Relevance Previously     +++ E/I/P + Blister     nR   = No Relevance

## 2018-08-03 NOTE — NURSING NOTE
Dermatology Rooming Note    Chaitanya Davidson's goals for this visit include:   Chief Complaint   Patient presents with     Derm Problem     patch testing day 5       Andreina Matta, EMT

## 2018-08-03 NOTE — PATIENT INSTRUCTIONS
Diagnosis:    ==> chronic hand and foot dermatitis chronic toxic/irritant dermatitis (DDx Psoriasis)    No allergic contact dermatitis found in patch tests    [x] Treatment prescribed:     --> pursue with UVB topical treatment 3x per week at home (reduce by 50% after adding the Acitretine)  --> Acitretine 25mg every evening   --> Urea cream 10%       Date/time of application: 8/30/2018  Physician/Nurse: / Aisha Cerda LPN   Localization of application: Back    STANDARD Series         No Substance 3 days 5 days remarks   1 OTIS mix (benzocaine, cinchocain, procain) - -    2 Colophony - -    3  2-mercaptobenzothiazole  - -     4 Methylisothiazolinone - -    5 CARBA mix - -    6 THIURAM mix - -    7 Bisphenol A Epoxy resin - -    8 c-tura-kiouzgoypgc-formaldehyde resin - -    9 MERCAPTO mix - -    10 BLACK RUBBER mix - -    11 Potassium DICHROMATE  -  -    12 Myroxylon pereirae resin (balsam of [place]) - -    13 Nickel (II) sulphate, 6H2O - -    14 Mixed dialkyl thiourea - -    15 PARABEN mix - -    16 Methyldibromo glutaro-nitrile/phenoxyethanole - -    17 FRAGRANCE I mix - -    18 Bronopol (2-Bromo-2-nitropropane-1,3-diol) - -    19 Lyral - -    20 Tixocortol-21- pivalate - -    21 Diazolidiyl urea (germall II; Formaldehyde releaser) - -    22 Methyl methacrylate - -    23 Cobalt (II) chloride, 6H2O - -    24 FRAGRANCE II mix - -    25 COMPOSITAE mix - -    26 Benzoylperoxide - -    27 Bacitracin - -    28 Formaldehyde - -    29 Methylchloroisothiazolinone/Methylisothiazolinone (Kathon CG) - -    30 CORTICOSTEROID mix - -    31 Sodium Lauryl Sulfate - -    32 Lanolin alcohol - -    33 Turpentine - -    34 Cetylstearyl alcohol - -    35 Chlorhexidine dicluconate - -    36 Budenoside - -    37 Imidazolidinyl Urea (Germall 115; Formaldehyde releaser) - -    38 Ethyl-2 Cyanoacrylate - -    39 Quaternium 15 (Dowicil 200) - -    40 Decyl Glucoside - -      Remarks:                    EMULSIFIERS &  ADDITIVES        No Substance 2 days 4 days remarks   41 Polyethylene glycol-400 - -    42 Cocamidopropylbetaine - -    43 Amerchol L101 - -    44 Propyleneglycol - -    45 Triethanolamine - -    46 Sorbitane Sesquiolate - -    47 Isopropylmyristate - -    48 Polysorbate 80 (Tween 80) - -    49 Amidoamine (stearamido-propyl dimethylamine) - -    50 Oleamidopropyl dimethylamine - -    51 Lauryl Glucoside - -    52 Coconut diethanolamide (cocamide NATHANAEL) - -    53 2-hydroxy-4methoxy-benzo-phenone (sunscreen) - -    54 Benzophenone-4 (sunscr) - -    55 Propolis - -    56 Dexpanthenol - -    57 Carboxymethylcellulose sodium - -    58 Abitol - -    59 tert-butylhydroquinone - -    60 Benzyl salicylate (sunscreen)  - -     antioxidant      61 Dodecyl gallate - -    62 Butylhydroxyanisole (BHA) - -    63 Butylhydroxytoluene (BHT) - -    64 di-a-Tocopherol (Vit E) - -    65 Propyl gallate - -      Remarks:            PRESERVATIVES & ANTIMICROBIALS         No Substance 2 days 4 days remarks   66  1,2-benzisothiazoline-3-one, sodium salt - -    67  1,3,5-carlos(2-hydroxyethyl) -hexahydrotriazine(Grotan BK) - -    68  2-pjhrfwcfpddgh-5-nitro-1,3-propanediol - -    69  3,4,4'-triclocarban - -    70 4-Chloro-3-cresol (PCMC) - -    71 4-Chloro-4-xylénol (PCMX) - -    72 7-ethylbicyclooxazolidine (biopan CS 1246) - -    73 Benzalkonium chloride - -    74 Benzyl alcohol - -    75 Cetalkonium chloride - -    76 Cetylpyrimidine chloride  - -    78 DMDM Hydantoin - -    79 Glutaraldehyde (+) -    80 Triclosan - -    81 Glyoxal trimeric dihydrate - -    82 Iodopropynyl butylcarbamate - -    83 2-n-Octyl-4-isothiazolin-3-one - -    84 Iodoform - -    85 (Nitrobutyl)morpholine/(Ethylnitrotrimethylene) dimorpholine(Biopan ) - -    86 Phenoxyethanol - -    87 Phenylsalicylate - -    88 Povidone Iodine (+) -    89 Sodium Benzoate - -    90 Sodium Disulfite - -    91 Sorbic Acid - -    92 Thimerosal - -     Parabens      93  butyl-p-hydroxybenzoate - -    94 ethyl-p-hydroxybenzoate - -    95 methyl-p-hydroxybenzoate - -    96 Propyl-p-hydroxybenzoate - -        Remarks:          METALS (implants/dental)         No Substance 2 days 4 days remarks   97 Ammonium heptamolybdate - -    98 Ammoniumtetrachloroplatinate (II) - -    99 Indium (III) chloride - -    100 Iridium (III) chloride - -    101 Iron chloride - -    102 Manganese chloride - -    103 Niobium chloride - -    104 Palladium chloride - -    105 Silver nitrate - -    106 Sodium thiosulfatoaureate (gold) - -    107 Tantal - -    108 Tin (II) chloride - -    109 Titanium (IV) Oxide - -    110 Titanium - -    111 Tungstic acid (sod salt dihydrat) - -    112 Vanadium pentoxide - -    113 Wolfram - -    114 Zinc chloride - -    115 Zirconium (IV) oxide - -    116 Mercury (II) Amidochloride - -    117 Copper sulfate pentahydrate - -    118 Amalgam (hg, ag, zn, cu, sn) - -    119 Aluminum hydroxide - -      Remarks:                  Results of patch tests:                         Interpretation:    - Negative                    A    = Allergic      (+) Erythema    TI   = Toxic/irritant   + E + Infiltration    RaP = Relevance at Present     ++ E/I + Papulovesicle   Rpr  = Relevance Previously     +++ E/I/P + Blister     nR   = No Relevance

## 2018-11-02 ENCOUNTER — RECORDS - HEALTHEAST (OUTPATIENT)
Dept: ADMINISTRATIVE | Facility: OTHER | Age: 48
End: 2018-11-02

## 2018-11-08 ENCOUNTER — RECORDS - HEALTHEAST (OUTPATIENT)
Dept: ADMINISTRATIVE | Facility: OTHER | Age: 48
End: 2018-11-08

## 2018-12-05 ENCOUNTER — RECORDS - HEALTHEAST (OUTPATIENT)
Dept: ADMINISTRATIVE | Facility: OTHER | Age: 48
End: 2018-12-05

## 2018-12-06 ENCOUNTER — RECORDS - HEALTHEAST (OUTPATIENT)
Dept: ADMINISTRATIVE | Facility: OTHER | Age: 48
End: 2018-12-06

## 2018-12-10 ENCOUNTER — OFFICE VISIT - HEALTHEAST (OUTPATIENT)
Dept: VASCULAR SURGERY | Facility: CLINIC | Age: 48
End: 2018-12-10

## 2018-12-10 DIAGNOSIS — S61.201A OPEN WOUND OF LEFT INDEX FINGER WITHOUT DAMAGE TO NAIL, INITIAL ENCOUNTER: ICD-10-CM

## 2018-12-10 DIAGNOSIS — E11.622 TYPE 2 DIABETES MELLITUS WITH OTHER SKIN ULCER, WITH LONG-TERM CURRENT USE OF INSULIN (H): ICD-10-CM

## 2018-12-10 DIAGNOSIS — L40.0 PLAQUE PSORIASIS: ICD-10-CM

## 2018-12-10 DIAGNOSIS — Z79.4 TYPE 2 DIABETES MELLITUS WITH OTHER SKIN ULCER, WITH LONG-TERM CURRENT USE OF INSULIN (H): ICD-10-CM

## 2018-12-10 ASSESSMENT — MIFFLIN-ST. JEOR: SCORE: 2000.24

## 2018-12-17 ENCOUNTER — OFFICE VISIT - HEALTHEAST (OUTPATIENT)
Dept: VASCULAR SURGERY | Facility: CLINIC | Age: 48
End: 2018-12-17

## 2018-12-17 DIAGNOSIS — Z79.4 TYPE 2 DIABETES MELLITUS WITH OTHER SKIN ULCER, WITH LONG-TERM CURRENT USE OF INSULIN (H): ICD-10-CM

## 2018-12-17 DIAGNOSIS — S61.201D OPEN WOUND OF LEFT INDEX FINGER WITHOUT DAMAGE TO NAIL, SUBSEQUENT ENCOUNTER: ICD-10-CM

## 2018-12-17 DIAGNOSIS — E11.622 TYPE 2 DIABETES MELLITUS WITH OTHER SKIN ULCER, WITH LONG-TERM CURRENT USE OF INSULIN (H): ICD-10-CM

## 2018-12-17 DIAGNOSIS — L40.0 PLAQUE PSORIASIS: ICD-10-CM

## 2021-06-02 VITALS — BODY MASS INDEX: 35.79 KG/M2 | WEIGHT: 250 LBS | HEIGHT: 70 IN

## 2021-06-27 NOTE — PROGRESS NOTES
Progress Notes by Ailyn Hough NP at 12/17/2018  9:40 AM     Author: Ailyn Hough NP Service: -- Author Type: Nurse Practitioner    Filed: 12/17/2018 10:06 AM Encounter Date: 12/17/2018 Status: Signed    : Ailyn Hough NP (Nurse Practitioner)       Follow up Vascular Visit       Date of Service:12/17/2018    Date Last Seen: 12/10/2018; 12/10/2018    Chief Complaint: non-healing left index finger ulcer    History:   Past Medical History:   Diagnosis Date   ? Diabetes mellitus (H)    ? Disease of thyroid gland    ? Hyperlipidemia    ? Plaque psoriasis        Pt [facility] with regards to their non-healing left index finger ulcer. Arrives alone. He is currently applying medihoney alginate; oil emulsion; gauze; securing with glove tip. Changing 1-2 times per day continues to work. Feeling well.Feels the wound is improved. Tolerated the medihoney; no pain.       Allergies: Escitalopram; Sertraline; and Venlafaxine analogues    Physical Exam:    /88   Pulse 60   Temp 98.2  F (36.8  C) (Oral)   Resp 16     General:  Patient presents to clinic in no apparent distress.  Head: normocephalic atraumatic  Psychiatric:  Alert and oriented x3.   Respiratory: unlabored breathing; no cough  Integumentary:  Skin is uniformly warm, dry and pink.    Wound #1 Location: left index finger  Size: 0.2L x 0.2W x 0.1cmdepth.  No sinus tract present, Wound base: 100% necrotic; did not tolerate debridement  No undermining present. Periwound: no denudement, erythema, induration, maceration or warmth.      Circumferential volume measures:    No flowsheet data found.    Ulceration(s)/Wound(s):     VASC Wound 12/10/18 left index finger (Active)   Pre Size Length 0.2 12/17/2018  9:00 AM   Pre Size Width 0.2 12/17/2018  9:00 AM   Pre Size Depth 0.1 12/17/2018  9:00 AM   Pre Total Sq cm 0.04 12/17/2018  9:00 AM        Lab Values    No results found for:  SEDRATE  Lab Results   Component Value Date    CREATININE 0.77 05/20/2013     No results found for: HGBA1C  Lab Results   Component Value Date    BUN 14 05/20/2013     No results found for: LABPROT, LABALUM, ALBUMIN  No results found for: [id number]          Impression:  1. Plaque psoriasis     2. Open wound of left index finger without damage to nail, subsequent encounter     3. Type 2 diabetes mellitus with other skin ulcer, with long-term current use of insulin (H)         12/7/18 left index finger tip       12/17/18 left index finger tip            Are any of these wounds new today: No; Location: na    Assessment/Plan:          1. Debridement: recommended but not tolerated           2. Edema: na.            3.  Wound treatment: wound treatment will include irrigation and dressings to promote autolytic debridement which will include:continue medihoney; oil emuslion; gauze; and secure with glove tip           4. Nutrition: diabetic           5. Offloading: na     Patient will follow up with me in 3 weeks for reevaluation. They were instructed to call the clinic sooner with any signs or symptoms of infection or any further questions/concerns. Answered all questions.    Ailyn Hough DNP, RN, CNP, [facility]  [facility]  [phone]        This note was electronically signed by Ailyn Hough

## 2021-06-27 NOTE — PROGRESS NOTES
Progress Notes by Ailyn Hough NP at 12/10/2018  8:40 AM     Author: Ailyn Hough NP Service: -- Author Type: Nurse Practitioner    Filed: 12/10/2018  9:20 AM Encounter Date: 12/10/2018 Status: Signed    : Ailyn Hough NP (Nurse Practitioner)       [facility] Consult Note    Date of Service:  12/10/2018    Requesting Provider: Dr Judy Boyd    Chief Complaint: finger ulcer    History of Present Illness: Chaitanya Davidson is being seen at the [facility] today regarding finger ulcer. They arrive to the clinic today alone. The patient reports that he has recently been diagnosed with plaque psoriasis; he developed thickened skin on the palms of his hands; almost begins to blister and soften and then peels off; he has been seen by dermatology. The finger tip ulcer began during the past few weeks after his skin peeled off.  He is currently applying carmol 40 to the left finger tip daily; covering with glove tip from nitrile glove; he feels the wound is improved; all of the thickened callused skin is now gone.  Reports pain of 6-7/10 when the finger tip is touched, bumped or exposed to cold; currently using nothing for pain. He is currently taking an antibiotic for the finger; he does not know the name; taking this two times a day; tolerating well. Denies any fevers, chills, or generalized ill feeling. Denies history of cancer. He does have diabetes; checks fs two times a day; these are running 100-150 this is baseline for him.     Review of Systems:   Constitutional:  negative  for fever, chills or night sweats  EENTM: positive for glasses;  negative [place]  GI:  negative for nausea/vomiting;  negative for constipation  negative diarrhea;  negative for fecal incontinence negative weight loss  :  negative dysuria, negative incontinence  MS: patient is ambulatory;  does not use assistive devices  Cardiac:  negative   Respiratory:  negative  SOB  Endocrine:  positive diabetes  Psych:  negative depression/anxiety    Past Medical History:    Past Medical History:   Diagnosis Date   ? Diabetes mellitus (H)    ? Disease of thyroid gland    ? Hyperlipidemia         Surgical History:   Past Surgical History:   Procedure Laterality Date   ? [place] Tarsal tunnel release  Bilateral     Feet        Medications:    Current Outpatient Medications:   ?  co-enzyme Q-10 30 mg capsule, Take 30 mg by mouth daily., Disp: , Rfl:   ?  glucosamine-chondroitin 500-400 mg cap, Take 2 capsules by mouth daily., Disp: , Rfl:   ?  levothyroxine (SYNTHROID, LEVOTHROID) 125 MCG tablet, Take 125 mcg by mouth daily., Disp: , Rfl:   ?  metFORMIN (GLUCOPHAGE) 1000 MG tablet, Take 1,000 mg by mouth 2 (two) times a day with meals., Disp: , Rfl:   ?  omega-3 fatty acids-vitamin E (FISH OIL) 1,000 mg cap, Take 1,000 Units by mouth daily., Disp: , Rfl:   ?  simvastatin (ZOCOR) 10 MG tablet, Take 10 mg by mouth bedtime. Patient unsure of dose, Disp: , Rfl:   ?  LANTUS SOLOSTAR U-100 INSULIN 100 unit/mL (3 mL) pen, 25 Units daily., Disp: , Rfl: 1    Current Facility-Administered Medications:   ?  lidocaine 2 % jelly (XYLOCAINE), , Topical, PRN, Ailyn Hough NP, 1 application at 12/10/18 0848    Allergies:   Allergies   Allergen Reactions   ? Escitalopram Other (See Comments)     More forgetful  Trouble focussing   ? Sertraline Other (See Comments)     Feels emotionless   ? Venlafaxine Analogues Other (See Comments)     Intolerant  Feels tired  Can't concentrate       Family history:   Family History   Problem Relation Age of Onset   ? Psoriasis Father    ? Dementia Father         Social History:   Social History     Socioeconomic History   ? Marital status:      Spouse name: Not on file   ? Number of children: Not on file   ? Years of education: Not on file   ? Highest education level: Not on file   Social Needs   ? Financial resource strain: Not on file   ? Food insecurity - worry:  "Not on file   ? Food insecurity - inability: Not on file   ? Transportation needs - medical: Not on file   ? Transportation needs - non-medical: Not on file   Occupational History   ? Not on file   Tobacco Use   ? Smoking status: Never Smoker   ? Smokeless tobacco: Never Used   Substance and Sexual Activity   ? Alcohol use: Yes     Frequency: 2-3 times a week     Drinks per session: 7 to 9     Binge frequency: Weekly   ? Drug use: No   ? Sexual activity: Not on file   Other Topics Concern   ? Not on file   Social History Narrative   ? Not on file        Physical Exam  Vitals: Blood pressure 138/88, pulse 68, temperature 98.1  F (36.7  C), temperature source Oral, resp. rate 16, height 5' 10\" (1.778 m), weight (!) 250 lb (113.4 kg).  General: This is a 48 y.o. male who appears their reported age, not in acute distress  Head: normocephalic, Atraumatic; wearing glasses; non-icteric sclera; no exudate;no  hearing loss   Respiratory: Clear throughout all lung fields; unlabored breathing; no cough   Cardiac: Regular, Rate and Rhythm, no murmurs appreciated   Skin: Uniformly warm and dry  Psych: Alert and oriented x4; calm and cooperative throughout exam  Extremities: left index finger tip with full thickness necrotic ulcer; with surrounding maceration; wound measures 0.5x0.5x0.1cm; due to pain could not debride the wound; no surrounding erythema; brisk cap refill; strong radial pulse; mild cracking on the ortiz surface of the hand    Circumferential volume measures:    No flowsheet data found.    Ulceration(s)/Wound(s):     VASC Wound 12/10/18 left index finger (Active)   Pre Size Length 0.5 12/10/2018  8:00 AM   Pre Size Width 0.5 12/10/2018  8:00 AM   Pre Size Depth 0.1 12/10/2018  8:00 AM   Pre Total Sq cm 0.25 12/10/2018  8:00 AM       Laboratory studies:   No results found for: SEDRATE  Lab Results   Component Value Date    CREATININE 0.77 05/20/2013     No results found for: HGBA1C  Lab Results   Component Value " Date    BUN 14 05/20/2013     No results found for: LABPROT, LABALUM, ALBUMIN  No results found for: QGCMCOCD27RM    12/10/18 left index finger          Impression:   Plaque psoriasis with left index finger ulcer  Type 2 diabetes with other skin complication      Assessment/Plan:  1. Excisional debridement was recommended but not tolerated due to pain    2. Edema. na    3. Treatment: wound treatment will include irrigation and dressings to promote autolytic debridement which will include: stop the carmol 40 for now; instead apply medihoney alginate to the wound; oil emulsion; 2x2 gauze; tip of glove to secure; due to location willhave to change 1-2 times per day; will order supplies    4. Offloading: na    5. Nutrition: diabetic     Patient to return to clinic in 1 week(s) for re-evaluation. They were instructed to call the clinic sooner with any further questions or concerns. Answered all questions.    Ailyn Hough DNP, RN, CNP, [facility]  [phone]      This note was electronically signed by Ailyn Hough

## 2022-04-28 ENCOUNTER — MEDICAL CORRESPONDENCE (OUTPATIENT)
Dept: HEALTH INFORMATION MANAGEMENT | Facility: CLINIC | Age: 52
End: 2022-04-28
Payer: COMMERCIAL

## 2022-04-28 DIAGNOSIS — R00.2 PALPITATIONS: Primary | ICD-10-CM

## 2022-04-28 DIAGNOSIS — R42 DIZZINESS: ICD-10-CM

## 2022-04-29 DIAGNOSIS — I48.92 ATRIAL FLUTTER (H): Primary | ICD-10-CM

## 2022-05-05 ENCOUNTER — TELEPHONE (OUTPATIENT)
Dept: CARDIOLOGY | Facility: CLINIC | Age: 52
End: 2022-05-05
Payer: COMMERCIAL

## 2022-05-05 NOTE — TELEPHONE ENCOUNTER
5/5/22 Incoming records recd from Dr Muir office for upcoming cardiology appt  OV 4/19 and written report of EKG. Tracing copy of poor quality  OV 4/20  3 day CAM report ( pt wore 4/5-4/8)  Sent to HIM for scanning. Copies on my desk  Laura 9 am     TENDERNESS/ALAN

## 2022-07-25 ENCOUNTER — OFFICE VISIT (OUTPATIENT)
Dept: CARDIOLOGY | Facility: CLINIC | Age: 52
End: 2022-07-25
Attending: INTERNAL MEDICINE
Payer: COMMERCIAL

## 2022-07-25 VITALS
SYSTOLIC BLOOD PRESSURE: 116 MMHG | HEART RATE: 53 BPM | WEIGHT: 275.1 LBS | DIASTOLIC BLOOD PRESSURE: 71 MMHG | OXYGEN SATURATION: 97 % | BODY MASS INDEX: 39.38 KG/M2 | HEIGHT: 70 IN

## 2022-07-25 DIAGNOSIS — I49.1 PAC (PREMATURE ATRIAL CONTRACTION): Primary | ICD-10-CM

## 2022-07-25 PROCEDURE — 99213 OFFICE O/P EST LOW 20 MIN: CPT | Performed by: INTERNAL MEDICINE

## 2022-07-25 PROCEDURE — 93000 ELECTROCARDIOGRAM COMPLETE: CPT | Performed by: INTERNAL MEDICINE

## 2022-07-25 RX ORDER — FLECAINIDE ACETATE 50 MG/1
50 TABLET ORAL 2 TIMES DAILY
Qty: 60 TABLET | Refills: 3 | Status: ON HOLD | OUTPATIENT
Start: 2022-07-25 | End: 2022-10-31

## 2022-07-25 NOTE — PROGRESS NOTES
"Service Date: 07/25/2022    Thank you for allowing me to participate in the care of your very delightful patient.  As you know, Stuart is a 51-year-old gentleman with history of known atrial tachyarrhythmias and frequent PVCs who was seen in a local ED a couple years ago for a sustained run, but fortunately it converted on its own.  He has been evaluated by Dr. Andrade recently for increasing episodes of atrial tachyarrhythmias after he got COVID-19 this past January.  Recent monitors showed that on those days he had frequent PACs up to 16% of the time and 1.1% \"atrial flutter\" with the longest episode lasting for 22 minutes long.    The patient has tried multiple AV ranjeet blocker drugs including beta blocker and calcium channel blocker, which he could not tolerate.  He is known to have normal cardiac structure and function and had negative stress test that was done a few months ago by Dr. Andrade and was asked to see me for further evaluation.    EKG today demonstrated sinus rhythm with PACs at a rate of 70 beats per minute.    We discussed at length about the natural progression of atrial tachyarrhythmias, which sometimes can escalate and at times could be triggered by any viral infection such as COVID.  His symptoms of malaise and fatigue could be from having frequent atrial arrhythmia versus the long-term residual effect of having COVID.  Because of this uncertainty, it is best to try to suppress with medical therapy rather than proceed with invasive EP study and ablation.  As the patient already tried AV ranjeet blocker agent with significant side effects, I would have him try flecainide 50 mg b.i.d. for 2 days and after that increase to 100 mg twice daily.  If he can tolerate, we will get an EKG in a few days after that.  Should the patient not tolerate flecainide, then we should pursue an EP study with potential ablation.  In the meantime, I asked for an actual copy of the monitor as I want to determine the " "location of his \"atrial flutter.\"  It is very unlikely the patient has paroxysmal atrial tachycardia than a typical right-sided atrial flutter.  Even if that is the case, EP study and ablation can be considered.    I went over the side effects of flecainide including proarrhythmic potential, which is quite low given that he has normal cardiac structure and function and normal EKG.    Yahir Ruvalcaba MD        D: 2022   T: 2022   MT: ADAM    Name:     ASHISH GREENChloé  MRN:      [id number]        Account:      [id number]   :      1970           Service Date: 2022       Document: [id number]  "

## 2022-07-25 NOTE — LETTER
7/25/2022    Judy Boyd  [employer] [address]  [place] [address]    RE: Chaitanya Davidson       Dear Colleague,     [facility] the [facility].  HPI and Plan:   See dictation    Today's clinic visit entailed:  The following tests were independently interpreted by me as noted in my documentation: monitor  15 minutes spent on the date of the encounter doing chart review, history and exam, documentation and further activities per the note  Provider  Link to MDM Help Grid     The level of medical decision making during this visit was of moderate complexity.      Orders Placed This Encounter   Procedures     EKG 12-lead complete w/read - Clinics (performed today)       Orders Placed This Encounter   Medications     flecainide (TAMBOCOR) 50 MG tablet     Sig: Take 1 tablet (50 mg) by mouth 2 times daily For 2 days then increase to 2 tabs (100 mg) twice daily     Dispense:  60 tablet     Refill:  3       There are no discontinued medications.      Encounter Diagnoses   Name Primary?     Atrial flutter (H)      PAC (premature atrial contraction) Yes       CURRENT MEDICATIONS:  Current Outpatient Medications   Medication Sig Dispense Refill     acitretin (SORIATANE) 25 MG CAPS capsule Take as directed 30 capsule 3     co-enzyme Q-10 30 MG CAPS capsule Take 30 mg by mouth       fish oil-omega-3 fatty acids 1000 MG capsule Take 1,000 Units by mouth       flecainide (TAMBOCOR) 50 MG tablet Take 1 tablet (50 mg) by mouth 2 times daily For 2 days then increase to 2 tabs (100 mg) twice daily 60 tablet 3     glucosamine-chondroitin 500-400 MG CAPS per capsule Take 2 capsules by mouth       levothyroxine (SYNTHROID/LEVOTHROID) 125 MCG tablet Take 125 mcg by mouth       pioglitazone (ACTOS) 30 MG tablet TK 1 T PO QD FOR BS  0     simvastatin (ZOCOR) 10 MG tablet Take 10 mg by mouth       urea (CARMOL) 10 % cream Apply  "topically as needed for dry skin Put on hands and feet morning and evening 410 g 1     metFORMIN (GLUCOPHAGE) 1000 MG tablet Take 1,000 mg by mouth (Patient not taking: Reported on 7/25/2022)         ALLERGIES     Allergies   Allergen Reactions     Escitalopram Other (See Comments)     More forgetful, Trouble focussing     Sertraline Other (See Comments)     Feels emotionless     Venlafaxine Analogues [Venlafaxine] Other (See Comments)     Intolerant, Feels tired, Can't concentrate       PAST MEDICAL HISTORY:  No past medical history on file.    PAST SURGICAL HISTORY:  Past Surgical History:   Procedure Laterality Date     OTHER SURGICAL HISTORY Bilateral     MI Tarsal tunnel release Feet       FAMILY HISTORY:  Family History   Problem Relation Age of Onset     Psoriasis Father      Skin Cancer No family hx of      Melanoma No family hx of      Dementia Father        SOCIAL HISTORY:  Social History     Socioeconomic History     Marital status:      Spouse name: None     Number of children: None     Years of education: None     Highest education level: None   Tobacco Use     Smoking status: Never Smoker     Smokeless tobacco: Never Used   Substance and Sexual Activity     Alcohol use: Yes     Comment: occasionally     Drug use: No       Review of Systems:  Skin:          Eyes:         ENT:         Respiratory:          Cardiovascular:         Gastroenterology:        Genitourinary:         Musculoskeletal:         Neurologic:         Psychiatric:         Heme/Lymph/Imm:         Endocrine:           Physical Exam:  Vitals: /71 (Cuff Size: Adult Large)   Pulse 53   Ht 1.778 m (5' 10\")   Wt 124.8 kg (275 lb 1.6 oz)   SpO2 97%   BMI 39.47 kg/m      Constitutional:  cooperative, alert and oriented, well developed, well nourished, in no acute distress obese      Skin:  warm and dry to the touch, no apparent skin lesions or masses noted          Head:  normocephalic, no masses or lesions        Eyes:  " "pupils equal and round, conjunctivae and lids unremarkable, sclera white, no xanthalasma, EOMS intact, no nystagmus        Lymph:No Cervical lymphadenopathy present     ENT:  no pallor or cyanosis        Neck:  carotid pulses are full and equal bilaterally, JVP normal, no carotid bruit        Respiratory:  normal breath sounds, clear to auscultation, normal A-P diameter, normal symmetry, normal respiratory excursion, no use of accessory muscles         Cardiac: regular rhythm, normal S1/S2, no S3 or S4, apical impulse not displaced, no murmurs, gallops or rubs                pulses full and equal, no bruits auscultated                                        GI:  abdomen soft, non-tender, BS normoactive, no mass, no HSM, no bruits obese      Extremities and Muscular Skeletal:  no deformities, clubbing, cyanosis, erythema observed              Neurological:  no gross motor deficits        Psych:  Alert and Oriented x 3        CC  Matthias Andrade MD  [facility]  [address] [place] [address]    Service Date: 07/25/2022    Thank you for allowing me to participate in the care of your very delightful patient.  As you know, Stuart is a 51-year-old gentleman with history of known atrial tachyarrhythmias and frequent PVCs who was seen in a local ED a couple years ago for a sustained run, but fortunately it converted on its own.  He has been evaluated by Dr. Andrade recently for increasing episodes of atrial tachyarrhythmias after he got COVID-19 this past January.  Recent monitors showed that on those days he had frequent PACs up to 16% of the time and 1.1% \"atrial flutter\" with the longest episode lasting for 22 minutes long.    The patient has tried multiple AV ranjeet blocker drugs including beta blocker and calcium channel blocker, which he could not tolerate.  He is known to have normal cardiac structure and function and had negative stress test that was done a few months ago by " "Dr. Andrade and was asked to see me for further evaluation.    EKG today demonstrated sinus rhythm with PACs at a rate of 70 beats per minute.    We discussed at length about the natural progression of atrial tachyarrhythmias, which sometimes can escalate and at times could be triggered by any viral infection such as COVID.  His symptoms of malaise and fatigue could be from having frequent atrial arrhythmia versus the long-term residual effect of having COVID.  Because of this uncertainty, it is best to try to suppress with medical therapy rather than proceed with invasive EP study and ablation.  As the patient already tried AV ranjeet blocker agent with significant side effects, I would have him try flecainide 50 mg b.i.d. for 2 days and after that increase to 100 mg twice daily.  If he can tolerate, we will get an EKG in a few days after that.  Should the patient not tolerate flecainide, then we should pursue an EP study with potential ablation.  In the meantime, I asked for an actual copy of the monitor as I want to determine the location of his \"atrial flutter.\"  It is very unlikely the patient has paroxysmal atrial tachycardia than a typical right-sided atrial flutter.  Even if that is the case, EP study and ablation can be considered.    I went over the side effects of flecainide including proarrhythmic potential, which is quite low given that he has normal cardiac structure and function and normal EKG.    Yahir Ruvalcaba MD        D: 2022   T: 2022   MT: JUDYG    Name:     ASHISH GREEN  MRN:      [id number]        Account:      [id number]   :      1970           Service Date: 2022       Document: [id number]      Thank you for allowing me to participate in the care of your patient.      Sincerely,     Yahir Stevens MD     [facility]  "

## 2022-07-25 NOTE — PROGRESS NOTES
HPI and Plan:   See dictation    Today's clinic visit entailed:  The following tests were independently interpreted by me as noted in my documentation: monitor  15 minutes spent on the date of the encounter doing chart review, history and exam, documentation and further activities per the note  Provider  Link to MDM Help Grid     The level of medical decision making during this visit was of moderate complexity.      Orders Placed This Encounter   Procedures     EKG 12-lead complete w/read - Clinics (performed today)       Orders Placed This Encounter   Medications     flecainide (TAMBOCOR) 50 MG tablet     Sig: Take 1 tablet (50 mg) by mouth 2 times daily For 2 days then increase to 2 tabs (100 mg) twice daily     Dispense:  60 tablet     Refill:  3       There are no discontinued medications.      Encounter Diagnoses   Name Primary?     Atrial flutter (H)      PAC (premature atrial contraction) Yes       CURRENT MEDICATIONS:  Current Outpatient Medications   Medication Sig Dispense Refill     acitretin (SORIATANE) 25 MG CAPS capsule Take as directed 30 capsule 3     co-enzyme Q-10 30 MG CAPS capsule Take 30 mg by mouth       fish oil-omega-3 fatty acids 1000 MG capsule Take 1,000 Units by mouth       flecainide (TAMBOCOR) 50 MG tablet Take 1 tablet (50 mg) by mouth 2 times daily For 2 days then increase to 2 tabs (100 mg) twice daily 60 tablet 3     glucosamine-chondroitin 500-400 MG CAPS per capsule Take 2 capsules by mouth       levothyroxine (SYNTHROID/LEVOTHROID) 125 MCG tablet Take 125 mcg by mouth       pioglitazone (ACTOS) 30 MG tablet TK 1 T PO QD FOR BS  0     simvastatin (ZOCOR) 10 MG tablet Take 10 mg by mouth       urea (CARMOL) 10 % cream Apply topically as needed for dry skin Put on hands and feet morning and evening 410 g 1     metFORMIN (GLUCOPHAGE) 1000 MG tablet Take 1,000 mg by mouth (Patient not taking: Reported on 7/25/2022)         ALLERGIES     Allergies   Allergen Reactions     Escitalopram  "Other (See Comments)     More forgetful, Trouble focussing     Sertraline Other (See Comments)     Feels emotionless     Venlafaxine Analogues [Venlafaxine] Other (See Comments)     Intolerant, Feels tired, Can't concentrate       PAST MEDICAL HISTORY:  No past medical history on file.    PAST SURGICAL HISTORY:  Past Surgical History:   Procedure Laterality Date     OTHER SURGICAL HISTORY Bilateral     [place] Tarsal tunnel release Feet       FAMILY HISTORY:  Family History   Problem Relation Age of Onset     Psoriasis Father      Skin Cancer No family hx of      Melanoma No family hx of      Dementia Father        SOCIAL HISTORY:  Social History     Socioeconomic History     Marital status:      Spouse name: None     Number of children: None     Years of education: None     Highest education level: None   Tobacco Use     Smoking status: Never Smoker     Smokeless tobacco: Never Used   Substance and Sexual Activity     Alcohol use: Yes     Comment: occasionally     Drug use: No       Review of Systems:  Skin:          Eyes:         ENT:         Respiratory:          Cardiovascular:         Gastroenterology:        Genitourinary:         Musculoskeletal:         Neurologic:         Psychiatric:         Heme/Lymph/Imm:         Endocrine:           Physical Exam:  Vitals: /71 (Cuff Size: Adult Large)   Pulse 53   Ht 1.778 m (5' 10\")   Wt 124.8 kg (275 lb 1.6 oz)   SpO2 97%   BMI 39.47 kg/m      Constitutional:  cooperative, alert and oriented, well developed, well nourished, in no acute distress obese      Skin:  warm and dry to the touch, no apparent skin lesions or masses noted          Head:  normocephalic, no masses or lesions        Eyes:  pupils equal and round, conjunctivae and lids unremarkable, sclera white, no xanthalasma, EOMS intact, no nystagmus        Lymph:No Cervical lymphadenopathy present     ENT:  no pallor or cyanosis        Neck:  carotid pulses are full and equal bilaterally, JVP " normal, no carotid bruit        Respiratory:  normal breath sounds, clear to auscultation, normal A-P diameter, normal symmetry, normal respiratory excursion, no use of accessory muscles         Cardiac: regular rhythm, normal S1/S2, no S3 or S4, apical impulse not displaced, no murmurs, gallops or rubs                pulses full and equal, no bruits auscultated                                        GI:  abdomen soft, non-tender, BS normoactive, no mass, no HSM, no bruits obese      Extremities and Muscular Skeletal:  no deformities, clubbing, cyanosis, erythema observed              Neurological:  no gross motor deficits        Psych:  Alert and Oriented x 3        CC  Matthias Andrade MD  [facility]  [address]  [place] [address]

## 2022-08-02 ENCOUNTER — TELEPHONE (OUTPATIENT)
Dept: CARDIOLOGY | Facility: CLINIC | Age: 52
End: 2022-08-02

## 2022-08-02 DIAGNOSIS — I48.0 PAROXYSMAL ATRIAL FIBRILLATION (H): Primary | ICD-10-CM

## 2022-08-02 NOTE — TELEPHONE ENCOUNTER
[facility] Call Center    Phone Message    May a detailed message be left on voicemail: yes     Reason for Call: Other: Pt would like a call back as he told he needed to schedule a procedure but there was no orders, Please reach out to discuss     Action Taken: Message routed to:  [facility] ([facility] Cardio    Travel Screening: Not Applicable

## 2022-08-02 NOTE — TELEPHONE ENCOUNTER
Patient called reports he tried taking the flecainide (took for two days) and reports he did not tolerate it.  Indicated he felt very irritable when on medication.   Would like to proceed with ablation.  Will have Dr Ruvalcaba review request.  informed patient that Dr Ruvalcaba is out of the office until 8/15

## 2022-08-15 NOTE — TELEPHONE ENCOUNTER
Proceed with AF ablation as he has both afib and flutter. Hold blood thinner 2 days before procedure. No need for truong or ct. qp

## 2022-08-16 NOTE — TELEPHONE ENCOUNTER
Spoke to patient he did clarify he has not been taking the flecainide.  Also he is not on a blood thinner.  Will update Dr Ruvalcaba regarding above as this may alter the details of the procedure.  BRIAN Kimble

## 2022-08-25 ENCOUNTER — MYC MEDICAL ADVICE (OUTPATIENT)
Dept: CARDIOLOGY | Facility: CLINIC | Age: 52
End: 2022-08-25

## 2022-08-26 NOTE — TELEPHONE ENCOUNTER
No blood thinner before procedure but will need it for 2 months post ablation. What kind of bleeding did he have when he was on a blood thinner and what blood thinner was it. qp

## 2022-09-03 NOTE — TELEPHONE ENCOUNTER
Sounds like he had lower gib when taking Eliquis a few years ago, prob hemorrhoids. He has not been eval by GI for it. Since he will be on a blood thinner post ablation for 2 months and given above history we should postpone procedure until he is seen by GI and cleared by them. The last thing we need is GIB post ablation when taking a blood thinner. I know this is inconvenient for him but I want him to be safe. qp

## 2022-09-08 ENCOUNTER — TELEPHONE (OUTPATIENT)
Dept: CARDIOLOGY | Facility: CLINIC | Age: 52
End: 2022-09-08

## 2022-09-08 NOTE — TELEPHONE ENCOUNTER
9/8/22 Msg recd from Dr Peg Montgomery, I spoke to PCP (Judy Boyd) about the need for GI eval prior to ablation. She would like us to fax her my note and mychart documentation of my recommendation    Records faxed to [phone]  Laura 348 pm

## 2022-09-18 ENCOUNTER — HEALTH MAINTENANCE LETTER (OUTPATIENT)
Age: 52
End: 2022-09-18

## 2022-09-30 ENCOUNTER — TELEPHONE (OUTPATIENT)
Dept: CARDIOLOGY | Facility: CLINIC | Age: 52
End: 2022-09-30

## 2022-09-30 NOTE — TELEPHONE ENCOUNTER
9/30/22 Fax recd from [facility] MD Dr Boyd PCP with results of Colonoscopy done on 9/22/22. Original to HIM  Copy given to Dr Peg Sanchez 10 am

## 2022-10-14 ENCOUNTER — HOSPITAL ENCOUNTER (OUTPATIENT)
Dept: CARDIOLOGY | Facility: CLINIC | Age: 52
Discharge: HOME | End: 2022-10-14
Attending: INTERNAL MEDICINE | Admitting: INTERNAL MEDICINE
Payer: COMMERCIAL

## 2022-10-14 DIAGNOSIS — I48.0 PAROXYSMAL ATRIAL FIBRILLATION (H): ICD-10-CM

## 2022-10-14 LAB
CREAT BLD-MCNC: 0.7 MG/DL (ref 0.7–1.3)
GFR SERPL CREATININE-BSD FRML MDRD: >60 ML/MIN/1.73M2

## 2022-10-14 PROCEDURE — 75572 CT HRT W/3D IMAGE: CPT | Mod: 26 | Performed by: INTERNAL MEDICINE

## 2022-10-14 PROCEDURE — 75572 CT HRT W/3D IMAGE: CPT

## 2022-10-14 PROCEDURE — 82565 ASSAY OF CREATININE: CPT

## 2022-10-14 PROCEDURE — 250N000011 HC RX IP 250 OP 636: Performed by: INTERNAL MEDICINE

## 2022-10-14 RX ORDER — DIPHENHYDRAMINE HCL 25 MG
25 CAPSULE ORAL
Status: DISCONTINUED | OUTPATIENT
Start: 2022-10-14 | End: 2022-10-15 | Stop reason: HOSPADM

## 2022-10-14 RX ORDER — ONDANSETRON 2 MG/ML
4 INJECTION INTRAMUSCULAR; INTRAVENOUS
Status: DISCONTINUED | OUTPATIENT
Start: 2022-10-14 | End: 2022-10-15 | Stop reason: HOSPADM

## 2022-10-14 RX ORDER — DIPHENHYDRAMINE HYDROCHLORIDE 50 MG/ML
25-50 INJECTION INTRAMUSCULAR; INTRAVENOUS
Status: DISCONTINUED | OUTPATIENT
Start: 2022-10-14 | End: 2022-10-15 | Stop reason: HOSPADM

## 2022-10-14 RX ORDER — IOPAMIDOL 755 MG/ML
500 INJECTION, SOLUTION INTRAVASCULAR ONCE
Status: COMPLETED | OUTPATIENT
Start: 2022-10-14 | End: 2022-10-14

## 2022-10-14 RX ORDER — ACYCLOVIR 200 MG/1
0-1 CAPSULE ORAL
Status: DISCONTINUED | OUTPATIENT
Start: 2022-10-14 | End: 2022-10-15 | Stop reason: HOSPADM

## 2022-10-14 RX ORDER — METHYLPREDNISOLONE SODIUM SUCCINATE 125 MG/2ML
125 INJECTION, POWDER, LYOPHILIZED, FOR SOLUTION INTRAMUSCULAR; INTRAVENOUS
Status: DISCONTINUED | OUTPATIENT
Start: 2022-10-14 | End: 2022-10-15 | Stop reason: HOSPADM

## 2022-10-14 RX ADMIN — IOPAMIDOL 100 ML: 755 INJECTION, SOLUTION INTRAVENOUS at 15:22

## 2022-10-28 ENCOUNTER — TELEPHONE (OUTPATIENT)
Dept: CARDIOLOGY | Facility: CLINIC | Age: 52
End: 2022-10-28

## 2022-10-28 DIAGNOSIS — I48.3 TYPICAL ATRIAL FLUTTER (H): ICD-10-CM

## 2022-10-28 DIAGNOSIS — I48.0 PAROXYSMAL ATRIAL FIBRILLATION (H): Primary | ICD-10-CM

## 2022-10-28 RX ORDER — SODIUM CHLORIDE, SODIUM LACTATE, POTASSIUM CHLORIDE, CALCIUM CHLORIDE 600; 310; 30; 20 MG/100ML; MG/100ML; MG/100ML; MG/100ML
INJECTION, SOLUTION INTRAVENOUS CONTINUOUS
Status: CANCELLED | OUTPATIENT
Start: 2022-10-28

## 2022-10-28 RX ORDER — LIDOCAINE 40 MG/G
CREAM TOPICAL
Status: CANCELLED | OUTPATIENT
Start: 2022-10-28

## 2022-10-28 NOTE — TELEPHONE ENCOUNTER
Second attempt to call patient to review instructions for schedule ablation on 10/31.  Awaiting return call from patient.  BRIAN Kimble

## 2022-10-28 NOTE — TELEPHONE ENCOUNTER
Message left for patient to review instructions to scheduled afib ablation on 10/31.  Awaiting return call from patient.  BRIAN Kimble

## 2022-10-31 ENCOUNTER — ANESTHESIA (OUTPATIENT)
Dept: CARDIOLOGY | Facility: CLINIC | Age: 52
End: 2022-10-31
Payer: COMMERCIAL

## 2022-10-31 ENCOUNTER — ANESTHESIA EVENT (OUTPATIENT)
Dept: CARDIOLOGY | Facility: CLINIC | Age: 52
End: 2022-10-31
Payer: COMMERCIAL

## 2022-10-31 ENCOUNTER — HOSPITAL ENCOUNTER (OUTPATIENT)
Facility: CLINIC | Age: 52
Discharge: HOME | End: 2022-10-31
Attending: INTERNAL MEDICINE | Admitting: INTERNAL MEDICINE
Payer: COMMERCIAL

## 2022-10-31 VITALS
DIASTOLIC BLOOD PRESSURE: 67 MMHG | SYSTOLIC BLOOD PRESSURE: 128 MMHG | BODY MASS INDEX: 40.13 KG/M2 | HEART RATE: 77 BPM | RESPIRATION RATE: 16 BRPM | WEIGHT: 280.3 LBS | TEMPERATURE: 97 F | HEIGHT: 70 IN | OXYGEN SATURATION: 97 %

## 2022-10-31 DIAGNOSIS — I48.3 TYPICAL ATRIAL FLUTTER (H): ICD-10-CM

## 2022-10-31 DIAGNOSIS — I48.0 PAROXYSMAL ATRIAL FIBRILLATION (H): ICD-10-CM

## 2022-10-31 LAB
ACT BLD: 233 SECONDS (ref 74–150)
ACT BLD: 339 SECONDS (ref 74–150)
ANION GAP SERPL CALCULATED.3IONS-SCNC: 5 MMOL/L (ref 3–14)
BUN SERPL-MCNC: 15 MG/DL (ref 7–30)
CALCIUM SERPL-MCNC: 9.3 MG/DL (ref 8.5–10.1)
CHLORIDE BLD-SCNC: 106 MMOL/L (ref 94–109)
CO2 SERPL-SCNC: 26 MMOL/L (ref 20–32)
CREAT SERPL-MCNC: 0.75 MG/DL (ref 0.66–1.25)
ERYTHROCYTE [DISTWIDTH] IN BLOOD BY AUTOMATED COUNT: 13.4 % (ref 10–15)
GFR SERPL CREATININE-BSD FRML MDRD: >90 ML/MIN/1.73M2
GLUCOSE BLD-MCNC: 146 MG/DL (ref 70–99)
GLUCOSE BLDC GLUCOMTR-MCNC: 130 MG/DL (ref 70–99)
HCT VFR BLD AUTO: 42.5 % (ref 40–53)
HGB BLD-MCNC: 13.9 G/DL (ref 13.3–17.7)
MCH RBC QN AUTO: 29.9 PG (ref 26.5–33)
MCHC RBC AUTO-ENTMCNC: 32.7 G/DL (ref 31.5–36.5)
MCV RBC AUTO: 91 FL (ref 78–100)
PLATELET # BLD AUTO: 232 10E3/UL (ref 150–450)
POTASSIUM BLD-SCNC: 4 MMOL/L (ref 3.4–5.3)
RBC # BLD AUTO: 4.65 10E6/UL (ref 4.4–5.9)
SODIUM SERPL-SCNC: 137 MMOL/L (ref 133–144)
WBC # BLD AUTO: 5.8 10E3/UL (ref 4–11)

## 2022-10-31 PROCEDURE — C1766 INTRO/SHEATH,STRBLE,NON-PEEL: HCPCS | Performed by: INTERNAL MEDICINE

## 2022-10-31 PROCEDURE — 250N000025 HC SEVOFLURANE, PER MIN: Performed by: INTERNAL MEDICINE

## 2022-10-31 PROCEDURE — 36415 COLL VENOUS BLD VENIPUNCTURE: CPT | Performed by: INTERNAL MEDICINE

## 2022-10-31 PROCEDURE — 93655 ICAR CATH ABLTJ DSCRT ARRHYT: CPT | Performed by: INTERNAL MEDICINE

## 2022-10-31 PROCEDURE — 258N000003 HC RX IP 258 OP 636: Performed by: INTERNAL MEDICINE

## 2022-10-31 PROCEDURE — 80048 BASIC METABOLIC PNL TOTAL CA: CPT | Performed by: INTERNAL MEDICINE

## 2022-10-31 PROCEDURE — C1887 CATHETER, GUIDING: HCPCS | Performed by: INTERNAL MEDICINE

## 2022-10-31 PROCEDURE — 999N000054 HC STATISTIC EKG NON-CHARGEABLE

## 2022-10-31 PROCEDURE — 85027 COMPLETE CBC AUTOMATED: CPT | Performed by: INTERNAL MEDICINE

## 2022-10-31 PROCEDURE — C1730 CATH, EP, 19 OR FEW ELECT: HCPCS | Performed by: INTERNAL MEDICINE

## 2022-10-31 PROCEDURE — C1759 CATH, INTRA ECHOCARDIOGRAPHY: HCPCS | Performed by: INTERNAL MEDICINE

## 2022-10-31 PROCEDURE — 36591 DRAW BLOOD OFF VENOUS DEVICE: CPT

## 2022-10-31 PROCEDURE — 250N000013 HC RX MED GY IP 250 OP 250 PS 637: Performed by: INTERNAL MEDICINE

## 2022-10-31 PROCEDURE — 93656 COMPRE EP EVAL ABLTJ ATR FIB: CPT | Performed by: INTERNAL MEDICINE

## 2022-10-31 PROCEDURE — C1733 CATH, EP, OTHR THAN COOL-TIP: HCPCS | Performed by: INTERNAL MEDICINE

## 2022-10-31 PROCEDURE — 250N000009 HC RX 250: Performed by: NURSE ANESTHETIST, CERTIFIED REGISTERED

## 2022-10-31 PROCEDURE — 250N000009 HC RX 250: Performed by: INTERNAL MEDICINE

## 2022-10-31 PROCEDURE — 370N000017 HC ANESTHESIA TECHNICAL FEE, PER MIN: Performed by: INTERNAL MEDICINE

## 2022-10-31 PROCEDURE — 258N000003 HC RX IP 258 OP 636: Performed by: ANESTHESIOLOGY

## 2022-10-31 PROCEDURE — 93010 ELECTROCARDIOGRAM REPORT: CPT | Performed by: INTERNAL MEDICINE

## 2022-10-31 PROCEDURE — 258N000003 HC RX IP 258 OP 636: Performed by: NURSE ANESTHETIST, CERTIFIED REGISTERED

## 2022-10-31 PROCEDURE — 85347 COAGULATION TIME ACTIVATED: CPT | Mod: 91

## 2022-10-31 PROCEDURE — 999N000071 HC STATISTIC HEART CATH LAB OR EP LAB

## 2022-10-31 PROCEDURE — C1732 CATH, EP, DIAG/ABL, 3D/VECT: HCPCS | Performed by: INTERNAL MEDICINE

## 2022-10-31 PROCEDURE — 250N000011 HC RX IP 250 OP 636: Performed by: INTERNAL MEDICINE

## 2022-10-31 PROCEDURE — 250N000011 HC RX IP 250 OP 636: Performed by: NURSE ANESTHETIST, CERTIFIED REGISTERED

## 2022-10-31 PROCEDURE — 272N000001 HC OR GENERAL SUPPLY STERILE: Performed by: INTERNAL MEDICINE

## 2022-10-31 PROCEDURE — 93005 ELECTROCARDIOGRAM TRACING: CPT

## 2022-10-31 PROCEDURE — 999N000184 HC STATISTIC TELEMETRY

## 2022-10-31 PROCEDURE — 82962 GLUCOSE BLOOD TEST: CPT

## 2022-10-31 RX ORDER — OXYCODONE AND ACETAMINOPHEN 5; 325 MG/1; MG/1
1 TABLET ORAL EVERY 4 HOURS PRN
Status: DISCONTINUED | OUTPATIENT
Start: 2022-10-31 | End: 2022-10-31 | Stop reason: HOSPADM

## 2022-10-31 RX ORDER — ACETAMINOPHEN 325 MG/1
650 TABLET ORAL EVERY 4 HOURS PRN
Status: DISCONTINUED | OUTPATIENT
Start: 2022-10-31 | End: 2022-10-31 | Stop reason: HOSPADM

## 2022-10-31 RX ORDER — HYDROMORPHONE HYDROCHLORIDE 1 MG/ML
0.4 INJECTION, SOLUTION INTRAMUSCULAR; INTRAVENOUS; SUBCUTANEOUS EVERY 5 MIN PRN
Status: DISCONTINUED | OUTPATIENT
Start: 2022-10-31 | End: 2022-10-31 | Stop reason: HOSPADM

## 2022-10-31 RX ORDER — OXYCODONE HYDROCHLORIDE 5 MG/1
5 TABLET ORAL EVERY 4 HOURS PRN
Status: DISCONTINUED | OUTPATIENT
Start: 2022-10-31 | End: 2022-10-31 | Stop reason: HOSPADM

## 2022-10-31 RX ORDER — NALOXONE HYDROCHLORIDE 0.4 MG/ML
0.2 INJECTION, SOLUTION INTRAMUSCULAR; INTRAVENOUS; SUBCUTANEOUS
Status: DISCONTINUED | OUTPATIENT
Start: 2022-10-31 | End: 2022-10-31 | Stop reason: HOSPADM

## 2022-10-31 RX ORDER — NALOXONE HYDROCHLORIDE 0.4 MG/ML
0.4 INJECTION, SOLUTION INTRAMUSCULAR; INTRAVENOUS; SUBCUTANEOUS
Status: DISCONTINUED | OUTPATIENT
Start: 2022-10-31 | End: 2022-10-31 | Stop reason: HOSPADM

## 2022-10-31 RX ORDER — GLYCOPYRROLATE 0.2 MG/ML
INJECTION, SOLUTION INTRAMUSCULAR; INTRAVENOUS PRN
Status: DISCONTINUED | OUTPATIENT
Start: 2022-10-31 | End: 2022-10-31

## 2022-10-31 RX ORDER — MEPERIDINE HYDROCHLORIDE 25 MG/ML
12.5 INJECTION INTRAMUSCULAR; INTRAVENOUS; SUBCUTANEOUS EVERY 5 MIN PRN
Status: DISCONTINUED | OUTPATIENT
Start: 2022-10-31 | End: 2022-10-31 | Stop reason: HOSPADM

## 2022-10-31 RX ORDER — TADALAFIL 2.5 MG/1
2.5 TABLET ORAL DAILY
COMMUNITY

## 2022-10-31 RX ORDER — SODIUM CHLORIDE, SODIUM LACTATE, POTASSIUM CHLORIDE, CALCIUM CHLORIDE 600; 310; 30; 20 MG/100ML; MG/100ML; MG/100ML; MG/100ML
INJECTION, SOLUTION INTRAVENOUS CONTINUOUS
Status: DISCONTINUED | OUTPATIENT
Start: 2022-10-31 | End: 2022-10-31 | Stop reason: HOSPADM

## 2022-10-31 RX ORDER — ALBUTEROL SULFATE 0.83 MG/ML
2.5 SOLUTION RESPIRATORY (INHALATION) EVERY 4 HOURS PRN
Status: DISCONTINUED | OUTPATIENT
Start: 2022-10-31 | End: 2022-10-31 | Stop reason: HOSPADM

## 2022-10-31 RX ORDER — NEOSTIGMINE METHYLSULFATE 1 MG/ML
VIAL (ML) INJECTION PRN
Status: DISCONTINUED | OUTPATIENT
Start: 2022-10-31 | End: 2022-10-31

## 2022-10-31 RX ORDER — LIDOCAINE HYDROCHLORIDE 20 MG/ML
INJECTION, SOLUTION INFILTRATION; PERINEURAL PRN
Status: DISCONTINUED | OUTPATIENT
Start: 2022-10-31 | End: 2022-10-31

## 2022-10-31 RX ORDER — ESCITALOPRAM OXALATE 10 MG/1
10 TABLET ORAL DAILY
COMMUNITY

## 2022-10-31 RX ORDER — HEPARIN SODIUM 1000 [USP'U]/ML
INJECTION, SOLUTION INTRAVENOUS; SUBCUTANEOUS
Status: DISCONTINUED | OUTPATIENT
Start: 2022-10-31 | End: 2022-10-31 | Stop reason: HOSPADM

## 2022-10-31 RX ORDER — HEPARIN SODIUM 200 [USP'U]/100ML
INJECTION, SOLUTION INTRAVENOUS
Status: DISCONTINUED | OUTPATIENT
Start: 2022-10-31 | End: 2022-10-31 | Stop reason: HOSPADM

## 2022-10-31 RX ORDER — FENTANYL CITRATE 50 UG/ML
50 INJECTION, SOLUTION INTRAMUSCULAR; INTRAVENOUS EVERY 5 MIN PRN
Status: DISCONTINUED | OUTPATIENT
Start: 2022-10-31 | End: 2022-10-31 | Stop reason: HOSPADM

## 2022-10-31 RX ORDER — PROTAMINE SULFATE 10 MG/ML
INJECTION, SOLUTION INTRAVENOUS
Status: DISCONTINUED | OUTPATIENT
Start: 2022-10-31 | End: 2022-10-31 | Stop reason: HOSPADM

## 2022-10-31 RX ORDER — PRAVASTATIN SODIUM 40 MG
40 TABLET ORAL DAILY
COMMUNITY

## 2022-10-31 RX ORDER — LIDOCAINE 40 MG/G
CREAM TOPICAL
Status: DISCONTINUED | OUTPATIENT
Start: 2022-10-31 | End: 2022-10-31 | Stop reason: HOSPADM

## 2022-10-31 RX ORDER — FENTANYL CITRATE 50 UG/ML
INJECTION, SOLUTION INTRAMUSCULAR; INTRAVENOUS PRN
Status: DISCONTINUED | OUTPATIENT
Start: 2022-10-31 | End: 2022-10-31

## 2022-10-31 RX ORDER — ONDANSETRON 4 MG/1
4 TABLET, ORALLY DISINTEGRATING ORAL EVERY 30 MIN PRN
Status: DISCONTINUED | OUTPATIENT
Start: 2022-10-31 | End: 2022-10-31 | Stop reason: HOSPADM

## 2022-10-31 RX ORDER — LABETALOL 20 MG/4 ML (5 MG/ML) INTRAVENOUS SYRINGE
10
Status: DISCONTINUED | OUTPATIENT
Start: 2022-10-31 | End: 2022-10-31 | Stop reason: HOSPADM

## 2022-10-31 RX ORDER — ONDANSETRON 2 MG/ML
INJECTION INTRAMUSCULAR; INTRAVENOUS PRN
Status: DISCONTINUED | OUTPATIENT
Start: 2022-10-31 | End: 2022-10-31

## 2022-10-31 RX ORDER — ONDANSETRON 2 MG/ML
4 INJECTION INTRAMUSCULAR; INTRAVENOUS EVERY 30 MIN PRN
Status: DISCONTINUED | OUTPATIENT
Start: 2022-10-31 | End: 2022-10-31 | Stop reason: HOSPADM

## 2022-10-31 RX ORDER — LISINOPRIL 40 MG/1
40 TABLET ORAL DAILY
COMMUNITY

## 2022-10-31 RX ORDER — PROPOFOL 10 MG/ML
INJECTION, EMULSION INTRAVENOUS PRN
Status: DISCONTINUED | OUTPATIENT
Start: 2022-10-31 | End: 2022-10-31

## 2022-10-31 RX ORDER — HYDRALAZINE HYDROCHLORIDE 20 MG/ML
2.5-5 INJECTION INTRAMUSCULAR; INTRAVENOUS EVERY 10 MIN PRN
Status: DISCONTINUED | OUTPATIENT
Start: 2022-10-31 | End: 2022-10-31 | Stop reason: HOSPADM

## 2022-10-31 RX ADMIN — PHENYLEPHRINE HYDROCHLORIDE 150 MCG: 10 INJECTION INTRAVENOUS at 10:49

## 2022-10-31 RX ADMIN — MIDAZOLAM 2 MG: 1 INJECTION INTRAMUSCULAR; INTRAVENOUS at 10:24

## 2022-10-31 RX ADMIN — SODIUM CHLORIDE, POTASSIUM CHLORIDE, SODIUM LACTATE AND CALCIUM CHLORIDE: 600; 310; 30; 20 INJECTION, SOLUTION INTRAVENOUS at 12:35

## 2022-10-31 RX ADMIN — LIDOCAINE HYDROCHLORIDE 100 MG: 20 INJECTION, SOLUTION INFILTRATION; PERINEURAL at 10:24

## 2022-10-31 RX ADMIN — FENTANYL CITRATE 100 MCG: 50 INJECTION, SOLUTION INTRAMUSCULAR; INTRAVENOUS at 10:24

## 2022-10-31 RX ADMIN — SODIUM CHLORIDE, POTASSIUM CHLORIDE, SODIUM LACTATE AND CALCIUM CHLORIDE: 600; 310; 30; 20 INJECTION, SOLUTION INTRAVENOUS at 09:27

## 2022-10-31 RX ADMIN — ONDANSETRON 4 MG: 2 INJECTION INTRAMUSCULAR; INTRAVENOUS at 11:37

## 2022-10-31 RX ADMIN — PHENYLEPHRINE HYDROCHLORIDE 100 MCG: 10 INJECTION INTRAVENOUS at 10:33

## 2022-10-31 RX ADMIN — PHENYLEPHRINE HYDROCHLORIDE 150 MCG: 10 INJECTION INTRAVENOUS at 10:50

## 2022-10-31 RX ADMIN — PHENYLEPHRINE HYDROCHLORIDE 0.5 MCG/KG/MIN: 10 INJECTION INTRAVENOUS at 10:34

## 2022-10-31 RX ADMIN — PROPOFOL 200 MG: 10 INJECTION, EMULSION INTRAVENOUS at 10:24

## 2022-10-31 RX ADMIN — PHENYLEPHRINE HYDROCHLORIDE 100 MCG: 10 INJECTION INTRAVENOUS at 10:36

## 2022-10-31 RX ADMIN — Medication 1 UNITS: at 10:40

## 2022-10-31 RX ADMIN — ACETAMINOPHEN 650 MG: 325 TABLET, FILM COATED ORAL at 13:45

## 2022-10-31 RX ADMIN — GLYCOPYRROLATE 1 MG: 0.2 INJECTION, SOLUTION INTRAMUSCULAR; INTRAVENOUS at 11:45

## 2022-10-31 RX ADMIN — Medication 5 MG: at 11:45

## 2022-10-31 ASSESSMENT — ACTIVITIES OF DAILY LIVING (ADL)
ADLS_ACUITY_SCORE: 35

## 2022-10-31 ASSESSMENT — ENCOUNTER SYMPTOMS: DYSRHYTHMIAS: 1

## 2022-10-31 NOTE — PROGRESS NOTES
Care Suites Admission Nursing Note    Patient Information  Name: Chaitanya Davidson  Age: 52 year old  Reason for admission: atrial fibrillation ablation   Care Suites arrival time: 0845    Visitor Information  Name: robert   Informed of visitor restrictions: Yes  1 visitor allowed per patient   Visitor must screen negative for COVID symptoms   Visitor must wear a mask  Waiting rooms closed to visitors    Patient Admission/Assessment   Pre-procedure assessment complete: Yes  If abnormal assessment/labs, provider notified: N/A  NPO: Yes  Medications held per instructions/orders: Yes  Consent: obtained  If applicable, pregnancy test status: deferred  Patient oriented to room: Yes  Education/questions answered: Yes  Plan/other: ablation    Discharge Planning  Discharge name/phone number: robert [phone]   Overnight post sedation caregiver: robert  Discharge location: home    Ebony Hanks RN

## 2022-10-31 NOTE — DISCHARGE INSTRUCTIONS
A Fib Ablation Discharge Instructions    After you go home:  Have an adult stay with you until tomorrow.  You may eat your normal diet, unless your doctor tells you otherwise.  RELAX and take it easy for 3 days.        For 24-48 hours (due to the sedation you received):  DO NOT DRIVE FOR 2 DAYS!   Do NOT make any important or legal decisions.  Do NOT drive or operate machines at home or at work.  Do NOT drink alcohol.    Care of Puncture Site:  Check the puncture site severy 1-2 hours while awake.  For 2-3 days, when you cough, sneeze, laugh or move your bowels, hold your hand over the puncture sites and press firmly.  Please remove Dressing after 24 hours.  Then apply a band aid daily for at least 3 days (if needed). If there is minor oozing, apply another band aid and remove it after 12 hours.   It is normal to have a small bruise or a small lump that is present under the skin . This will go away on its own after 3-4 weeks.   You may shower. Do NOT take a bath, or use a hot tub or pool until groin site heals, which may take up to a week.  Do NOT scrub the site. Do NOT use lotion or powder near the puncture site.    Activity:  Do NOT lift, push or pull more than 10 pounds (equal to a gallon of milk) for 3 days.  NO repetitive motions such as loading , vacuuming, raking, shoveling.     Bleeding:  If you start bleeding from the groin site, lie down flat and press firmly on the site for 10 minutes or until bleeding stops.   Once bleeding stops, lay flat for 1-2 hours.  Call your A Fib nurse if bleeding does not stop or after hours will need to go to ER.       Go to ER or Call 911 right away if you have heavy bleeding or bleeding that does not stop.    Medications:  Take your medications, including blood thinners, unless your doctor tells you not to.  If you have stopped any other medicines, check with your nurse or provider about when to restart them.  If you have PAIN or a TIGHTNESS in your chest, you MAY  takeTylenol (acetaminophen) and if this does not help, you MAY take Advil (ibuprofen-400 mg with food).  If you have constipation or prone to constipation,  take a fiber supplement, ie metamucil or stool softners.    Call the A Fib RN if:  Chest pain not relieved by acetaminophen or ibuprofen  Difficulty swallowing and/or coughing up blood  Shortness of breath  Increased groin pain or a large or growing hard lump around the site.  Groin site is red, swollen, hot or tender.  Blood or fluid is draining from the groin site.  You have chills or a fever greater than 101 F (38 C).  Your leg feels numb, cool or changes color.  If groin pain is not relieved by Tylenol or Ibuprofen.  Recurrent irregular or fast heart rate lasting over 2-3 hours.  Any questions or concerns.    Heart rhythms:  You may have some irregular heartbeats. These feel very strong. They may make you feel that the A Fib is going to start again.  Give it time. The irregular beats should occur less often.    Follow Up Appointments:  11/7/2022 with Karo Atwood at 1:30pm in [place]  1/31/2023 with Dr Ruvalcaba at 1:45pm in [facility]   A Fib clinic RN's Korina Ryan [phone] (Mon-Fri, 8:00-4:30)   [phone] Option 2 (7 days a week) after hours for on call Cardiologist.

## 2022-10-31 NOTE — ANESTHESIA PROCEDURE NOTES
Airway       Patient location during procedure: OR       Procedure Start/Stop Times: 10/31/2022 10:28 AM  Staff -        CRNA: Vilma Laird APRN CRNA       Performed By: CRNA  Consent for Airway        Urgency: elective  Indications and Patient Condition       Indications for airway management: nilo-procedural       Induction type:intravenous       Mask difficulty assessment: 3 - difficult mask (inadequate, unstable, or two providers) +/- NMBA (unable to get good seal due to patient's beard)    Final Airway Details       Final airway type: endotracheal airway       Successful airway: ETT - single and Oral  Endotracheal Airway Details        ETT size (mm): 8.0       Cuffed: yes       Successful intubation technique: video laryngoscopy       VL Blade Size: Grider 4       Grade View of Cords: 1       Adjucts: stylet       Position: Right       Measured from: lips       Secured at (cm): 24       Bite block used: None    Post intubation assessment        Placement verified by: capnometry, equal breath sounds and chest rise        Number of attempts at approach: 1       Secured with: plastic tape       Ease of procedure: easy       Dentition: Intact and Unchanged    Medication(s) Administered   Medication Administration Time: 10/31/2022 10:28 AM

## 2022-10-31 NOTE — ANESTHESIA CARE TRANSFER NOTE
Patient: Chaitanya Davidson    Procedure: Procedure(s):  Ablation Atrial Fibrilation       Diagnosis: afib/flutter  Diagnosis Additional Information: No value filed.    Anesthesia Type:   General     Note:    Oropharynx: oropharynx clear of all foreign objects and spontaneously breathing  Level of Consciousness: drowsy  Oxygen Supplementation: face mask  Level of Supplemental Oxygen (L/min / FiO2): 10  Independent Airway: airway patency satisfactory and stable  Dentition: dentition unchanged  Vital Signs Stable: post-procedure vital signs reviewed and stable  Report to RN Given: handoff report given  Patient transferred to: PACU  Comments: Neuromuscular blockade reversed after TOF 4/4, spontaneous respirations, adequate tidal volumes, followed commands to voice, oropharynx suctioned with soft flexible catheter, extubated atraumatically, extubated with suction, airway patent after extubation.  Oxygen via facemask at 10 liters per minute to PACU. Oxygen tubing connected to wall O2 in PACU, SpO2, NiBP, and EKG monitors and alarms on and functioning, report on patient's clinical status given to PACU RN, RN questions answered.     Handoff Report: Identifed the Patient, Identified the Reponsible Provider, Reviewed the pertinent medical history, Discussed the surgical course, Reviewed Intra-OP anesthesia mangement and issues during anesthesia, Set expectations for post-procedure period and Allowed opportunity for questions and acknowledgement of understanding      Vitals:  Vitals Value Taken Time   /82 10/31/22 1203   Temp     Pulse 88 10/31/22 1207   Resp 20 10/31/22 1207   SpO2 95 % 10/31/22 1207   Vitals shown include unvalidated device data.    Electronically Signed By: DARRYN Gardner CRNA  October 31, 2022  12:08 PM

## 2022-10-31 NOTE — ANESTHESIA PREPROCEDURE EVALUATION
Anesthesia Pre-Procedure Evaluation    Patient: Chaitanya Davidson   MRN: [id number] : 1970        Procedure : Procedure(s):  Ablation Atrial Fibrilation          No past medical history on file.   Past Surgical History:   Procedure Laterality Date     OTHER SURGICAL HISTORY Bilateral     DC Tarsal tunnel release Feet      Allergies   Allergen Reactions     Escitalopram Other (See Comments)     More forgetful, Trouble focussing     Sertraline Other (See Comments)     Feels emotionless     Venlafaxine Analogues [Venlafaxine] Other (See Comments)     Intolerant, Feels tired, Can't concentrate      Social History     Tobacco Use     Smoking status: Never     Smokeless tobacco: Never   Substance Use Topics     Alcohol use: Yes     Comment: occasionally      Wt Readings from Last 1 Encounters:   10/31/22 127.1 kg (280 lb 4.8 oz)        Anesthesia Evaluation   Pt has had prior anesthetic.     No history of anesthetic complications       ROS/MED HX  ENT/Pulmonary:    (-) sleep apnea   Neurologic:    (-) no CVA   Cardiovascular:     (+) -----dysrhythmias, a-fib, a-flutter and PVCs,  (-) CAD   METS/Exercise Tolerance:     Hematologic:       Musculoskeletal:       GI/Hepatic:     (+) GERD,     Renal/Genitourinary:       Endo:     (+) thyroid problem, hypothyroidism, Obesity,  (-) Type II DM   Psychiatric/Substance Use:       Infectious Disease:       Malignancy:       Other:            Physical Exam    Airway        Mallampati: II   TM distance: > 3 FB   Neck ROM: full   Mouth opening: > 3 cm    Respiratory Devices and Support         Dental  no notable dental history         Cardiovascular   cardiovascular exam normal          Pulmonary   pulmonary exam normal                OUTSIDE LABS:  CBC:   Lab Results   Component Value Date    WBC 5.8 10/31/2022    HGB 13.9 10/31/2022    HCT 42.5 10/31/2022     10/31/2022     BMP:   Lab Results   Component Value Date    CR 0.7 10/14/2022     COAGS: No results found for:  PTT, INR, FIBR  POC: No results found for: BGM, HCG, HCGS  HEPATIC: No results found for: ALBUMIN, PROTTOTAL, ALT, AST, GGT, ALKPHOS, BILITOTAL, BILIDIRECT, MAYLIN  OTHER: No results found for: PH, LACT, A1C, CHELLY, PHOS, MAG, LIPASE, AMYLASE, TSH, T4, T3, CRP, SED    Anesthesia Plan    ASA Status:  2   NPO Status:  NPO Appropriate    Anesthesia Type: General.     - Airway: ETT   Induction: Propofol, Intravenous.   Maintenance: Balanced.        Consents    Anesthesia Plan(s) and associated risks, benefits, and realistic alternatives discussed. Questions answered and patient/representative(s) expressed understanding.    - Discussed:     - Discussed with:  Patient         Postoperative Care    Pain management: Multi-modal analgesia.   PONV prophylaxis: Ondansetron (or other 5HT-3), Dexamethasone or Solumedrol     Comments:                Diane Ley MD, MD

## 2022-10-31 NOTE — PROGRESS NOTES
Care Suites Discharge Nursing Note    Patient Information  Name: Chaitanya Davidson  Age: 52 year old    Discharge Education:  Discharge instructions reviewed: Yes  Additional education/resources provided: no  Patient/patient representative verbalizes understanding: Yes  Patient discharging on new medications: yes  Medication education completed: Yes    Discharge Plans:   Discharge location: home  Discharge ride contacted: Yes  Approximate discharge time: 1415    Discharge Criteria:  Discharge criteria met and vital signs stable: Yes    Patient Belongs:  Patient belongings returned to patient: Yes    Ebony Hanks RN

## 2022-10-31 NOTE — PROGRESS NOTES
AF ablation  Uneventful PVI and empiric ablation of the CTI  Remove suture after one hour of bedrest  eliquis 5 mg bid for 2 months  Home around 230 pm

## 2022-10-31 NOTE — ANESTHESIA POSTPROCEDURE EVALUATION
Patient: Chaitanya Davidson    Procedure: Procedure(s):  Ablation Atrial Fibrilation       Anesthesia Type:  General    Note:     Postop Pain Control: Uneventful            Sign Out: Well controlled pain   PONV: No   Neuro/Psych: Uneventful            Sign Out: Acceptable/Baseline neuro status   Airway/Respiratory: Uneventful            Sign Out: Acceptable/Baseline resp. status   CV/Hemodynamics: Uneventful            Sign Out: Acceptable CV status; No obvious hypovolemia; No obvious fluid overload   Other NRE: NONE   DID A NON-ROUTINE EVENT OCCUR? No           Last vitals:  Vitals Value Taken Time   /84 10/31/22 1220   Temp 36.1  C (97  F) 10/31/22 1203   Pulse 84 10/31/22 1227   Resp 13 10/31/22 1227   SpO2 94 % 10/31/22 1227   Vitals shown include unvalidated device data.    Electronically Signed By: Diane Ley MD, MD  October 31, 2022  12:28 PM

## 2022-10-31 NOTE — CONSULTS
"[facility]    Cardiac Electrophysiology Consultation     Date of Admission:  10/31/2022  Date of Consult (When I saw the patient): 10/31/22    Assessment & Plan   Thank you for allowing me to participate in the care of your very delightful patient.  As you know, Stuart is a 51-year-old gentleman with history of known atrial tachyarrhythmias and frequent PVCs who was seen in a local ED a couple years ago for a sustained run, but fortunately it converted on its own.  He has been evaluated by Dr. Andrade recently for increasing episodes of atrial tachyarrhythmias after he got COVID-19 this past January.  Recent monitors showed that on those days he had frequent PACs up to 16% of the time and 1.1% \"atrial flutter\" with the longest episode lasting for 22 minutes long.     The patient has tried multiple AV ranjeet blocker drugs including beta blocker and calcium channel blocker, which he could not tolerate.  He is known to have normal cardiac structure and function and had negative stress test that was done a few months ago by Dr. Andrade and was asked to see me for further evaluation.     EKG today demonstrated sinus rhythm with PACs at a rate of 70 beats per minute.    In further review of the ZIO which show pAF. Patient could not tolerate Flecainide due to side-effects. We discussed option of ablation. Discussed risks of the ablation including but not limited to vascular injury and CVA. He has a history of GIB but recent GI eval was unremarkable.   Yahir Stevens MD    Code Status    No Order    Primary Care Physician   Judy Boyd    History is obtained from the patient    Past Medical History   I have reviewed this patient's medical history and updated it with pertinent information if needed.   No past medical history on file.    Past Surgical History   I have reviewed this patient's surgical history and updated it with pertinent information if needed.  Past Surgical History:   Procedure " Laterality Date     OTHER SURGICAL HISTORY Bilateral     OH Tarsal tunnel release Feet       Prior to Admission Medications   Prior to Admission Medications   Prescriptions Last Dose Informant Patient Reported? Taking?   Vitamin D3 (CHOLECALCIFEROL) 125 MCG (5000 UT) tablet 10/30/2022  Yes Yes   Sig: Take by mouth daily   co-enzyme Q-10 30 MG CAPS capsule 10/30/2022  Yes Yes   Sig: Take 30 mg by mouth   escitalopram (LEXAPRO) 10 MG tablet 10/30/2022  Yes Yes   Sig: Take 10 mg by mouth daily   fish oil-omega-3 fatty acids 1000 MG capsule 10/30/2022  Yes Yes   Sig: Take 1,000 Units by mouth   glucosamine-chondroitin 500-400 MG CAPS per capsule 10/30/2022  Yes Yes   Sig: Take 2 capsules by mouth   insulin  UNIT/ML vial 10/30/2022  Yes Yes   Sig: Inject 30 Units Subcutaneous 2 times daily   levothyroxine (SYNTHROID/LEVOTHROID) 125 MCG tablet 10/31/2022  Yes Yes   Sig: Take 125 mcg by mouth   lisinopril (ZESTRIL) 40 MG tablet 10/31/2022  Yes Yes   Sig: Take 40 mg by mouth daily   pioglitazone (ACTOS) 30 MG tablet 10/30/2022  Yes Yes   Sig: TK 1 T PO QD FOR BS   pravastatin (PRAVACHOL) 40 MG tablet 10/30/2022  Yes Yes   Sig: Take 40 mg by mouth daily   tadalafil (CIALIS) 2.5 MG tablet Past Week  Yes Yes   Sig: Take 2.5 mg by mouth daily   urea (CARMOL) 10 % cream 10/30/2022  No Yes   Sig: Apply topically as needed for dry skin Put on hands and feet morning and evening      Facility-Administered Medications: None     Allergies   Allergies   Allergen Reactions     Escitalopram Other (See Comments)     More forgetful, Trouble focussing     Sertraline Other (See Comments)     Feels emotionless     Venlafaxine Analogues [Venlafaxine] Other (See Comments)     Intolerant, Feels tired, Can't concentrate       Social History   I have reviewed this patient's social history and updated it with pertinent information if needed. Chaitanya Davidson  reports that he has never smoked. He has never used smokeless tobacco. He reports  current alcohol use. He reports that he does not use drugs.    Family History   I have reviewed this patient's family history and updated it with pertinent information if needed.   Family History   Problem Relation Age of Onset     Psoriasis Father      Skin Cancer No family hx of      Melanoma No family hx of      Dementia Father        Review of Systems   Comprehensive review of systems was performed with pertinent positives and negatives listed in assessment and plan section.    Physical Exam   Temp: 98.2  F (36.8  C) Temp src: Oral BP: 134/82 Pulse: 64   Resp: 12 SpO2: 97 % O2 Device: None (Room air)    Vital Signs with Ranges  Temp:  [98.2  F (36.8  C)] 98.2  F (36.8  C)  Pulse:  [64] 64  Resp:  [12-18] 12  BP: (134)/(82-83) 134/82  SpO2:  [97 %] 97 %  280 lbs 4.8 oz    Constitutional: awake, alert, cooperative, no apparent distress, and appears stated age  Eyes: Lids and lashes normal, pupils equal, round and reactive to light, extra ocular muscles intact, sclera clear, conjunctiva normal  ENT: Normocephalic, without obvious abnormality, atraumatic, sinuses nontender on palpation, external ears without lesions, oral pharynx with moist mucous membranes, tonsils without erythema or exudates, gums normal and good dentition.  Hematologic / Lymphatic: no cervical lymphadenopathy  Respiratory: No increased work of breathing, good air exchange, clear to auscultation bilaterally, no crackles or wheezing  Cardiovascular: Normal apical impulse, regular rate and rhythm, normal S1 and S2, no S3 or S4, and no murmur noted  GI: No scars, normal bowel sounds, soft, non-distended, non-tender, no masses palpated, no hepatosplenomegally  Skin: no bruising or bleeding  Musculoskeletal: There is no redness, warmth, or swelling of the joints.  Full range of motion noted.  Motor strength is 5 out of 5 all extremities bilaterally.  Tone is normal.  Neurologic: Awake, alert,  Neuropsychiatric: General: normal, calm and normal eye  contact    Data   I personally reviewed all recent ECGs and images.  Results for orders placed or performed during the hospital encounter of 10/31/22 (from the past 24 hour(s))   CBC with platelets   Result Value Ref Range    WBC Count 5.8 4.0 - 11.0 10e3/uL    RBC Count 4.65 4.40 - 5.90 10e6/uL    Hemoglobin 13.9 13.3 - 17.7 g/dL    Hematocrit 42.5 40.0 - 53.0 %    MCV 91 78 - 100 fL    MCH 29.9 26.5 - 33.0 pg    MCHC 32.7 31.5 - 36.5 g/dL    RDW 13.4 10.0 - 15.0 %    Platelet Count 232 150 - 450 10e3/uL   Basic metabolic panel   Result Value Ref Range    Sodium 137 133 - 144 mmol/L    Potassium 4.0 3.4 - 5.3 mmol/L    Chloride 106 94 - 109 mmol/L    Carbon Dioxide (CO2) 26 20 - 32 mmol/L    Anion Gap 5 3 - 14 mmol/L    Urea Nitrogen 15 7 - 30 mg/dL    Creatinine 0.75 0.66 - 1.25 mg/dL    Calcium 9.3 8.5 - 10.1 mg/dL    Glucose 146 (H) 70 - 99 mg/dL    GFR Estimate >90 >60 mL/min/1.73m2   EKG 12-lead, tracing only   Result Value Ref Range    Systolic Blood Pressure  mmHg    Diastolic Blood Pressure  mmHg    Ventricular Rate 58 BPM    Atrial Rate 58 BPM    DC Interval 186 ms    QRS Duration 82 ms     ms    QTc 435 ms    P Axis 42 degrees    R AXIS 41 degrees    T Axis 45 degrees    Interpretation ECG       Sinus bradycardia  Possible Anterior infarct , age undetermined  Abnormal ECG  No previous ECGs available

## 2022-10-31 NOTE — Clinical Note
Potential access sites were evaluated for patency using ultrasound.   The right femoral vein was selected. Access was obtained under with Sonosite guidance using a micropuncture 21 gauge needle with direct visualization of needle entry.

## 2022-11-01 ENCOUNTER — TELEPHONE (OUTPATIENT)
Dept: CARDIOLOGY | Facility: CLINIC | Age: 52
End: 2022-11-01

## 2022-11-01 DIAGNOSIS — R33.9 URINARY RETENTION: Primary | ICD-10-CM

## 2022-11-01 RX ORDER — TAMSULOSIN HYDROCHLORIDE 0.4 MG/1
0.4 CAPSULE ORAL DAILY
Qty: 3 CAPSULE | Refills: 0 | Status: SHIPPED | OUTPATIENT
Start: 2022-11-01

## 2022-11-01 NOTE — TELEPHONE ENCOUNTER
Reviewed with Dr Ruvalcaba regarding difficulty with producing a strong stream for urination.  Verbal orders per Dr Ruvalcaba:  flomax 0.4mg po every day for 3 days     Please call in 3 days or sooner with update  Reviewed above with patient and wife Cici.  Both provided verbal understanding regarding above.  BRIAN Kimble

## 2022-11-01 NOTE — TELEPHONE ENCOUNTER
Spoke to patient iand wife Cici in follow up to afib ablation completed yesterday.  Patient denies CP, SOB, lightheadedness or feeling dizzy.  Dressing intact on right groin.  Patient instructed to remove this afternoon and monitor.  Reports he is having some issues with urination.  Denies fever.  Reports he is not emptying out like he should.  Urinating every 30 minutes.  Denies burning or spasms.  Reviewed follow up appt and discharge instructions with patient.  Will get consent to communicate updated at  on 11/7.  Informed patient that I will discuss with Dr Ruvalcaba issues with urination and will get back to patient with recommendations.  BRIAN Kimble

## 2022-11-02 LAB
ATRIAL RATE - MUSE: 58 BPM
DIASTOLIC BLOOD PRESSURE - MUSE: NORMAL MMHG
INTERPRETATION ECG - MUSE: NORMAL
P AXIS - MUSE: 42 DEGREES
PR INTERVAL - MUSE: 186 MS
QRS DURATION - MUSE: 82 MS
QT - MUSE: 444 MS
QTC - MUSE: 435 MS
R AXIS - MUSE: 41 DEGREES
SYSTOLIC BLOOD PRESSURE - MUSE: NORMAL MMHG
T AXIS - MUSE: 45 DEGREES
VENTRICULAR RATE- MUSE: 58 BPM

## 2022-11-07 ENCOUNTER — OFFICE VISIT (OUTPATIENT)
Dept: CARDIOLOGY | Facility: CLINIC | Age: 52
End: 2022-11-07
Payer: COMMERCIAL

## 2022-11-07 ENCOUNTER — TRANSFERRED RECORDS (OUTPATIENT)
Dept: HEALTH INFORMATION MANAGEMENT | Facility: CLINIC | Age: 52
End: 2022-11-07

## 2022-11-07 VITALS
HEART RATE: 84 BPM | SYSTOLIC BLOOD PRESSURE: 120 MMHG | OXYGEN SATURATION: 96 % | BODY MASS INDEX: 40.96 KG/M2 | DIASTOLIC BLOOD PRESSURE: 76 MMHG | HEIGHT: 70 IN | WEIGHT: 286.1 LBS

## 2022-11-07 DIAGNOSIS — I48.0 PAROXYSMAL ATRIAL FIBRILLATION (H): Primary | ICD-10-CM

## 2022-11-07 PROCEDURE — 99213 OFFICE O/P EST LOW 20 MIN: CPT | Performed by: NURSE PRACTITIONER

## 2022-11-07 PROCEDURE — 93000 ELECTROCARDIOGRAM COMPLETE: CPT | Performed by: NURSE PRACTITIONER

## 2022-11-07 NOTE — PROGRESS NOTES
"  Electrophysiology Clinic Progress Note  Chaitanya Davidson MRN# [id number]   YOB: 1970 Age: 52 year old     Primary cardiologist: Dr. Ruvalcaba    Reason for visit: Follow up atrial fibrillation ablation    History of presenting illness:    Chaitanya Davidson is a pleasant 52 year old patient with past medical history significant for diabetes paroxysmal atrial fibrillation, and PACs,     He has a history of known atrial tachyarrhythmias and frequent PVCs and was evaluated in the emergency department a couple years ago.  Due to increasing episodes after having COVID-19 in January he was evaluated by Dr. Andrade.  His recent monitor showed PACs with a 16% burden and \"atrial flutter\" with a 1.1% burden with the longest episode lasting 22 minutes.  The patient has been intolerant to multiple beta-blockers and calcium channel blockers.    A previous echocardiogram that showed normal cardiac structure and function as well as a negative stress test was completed with Dr. Andrade's office but unfortunately I am unable to see.    He was referred to Dr. Ruvalcaba initially in July and discussed ablation.  He also placed him on flecainide.  Unfortunately, the patient called back after the visit did not tolerated and stated he did not tolerate it as it made him very irritable.  Dr. Ruvalcaba recommended that they proceed with an atrial fibrillation ablation with a CT prior.    The ablation was completed on 10/31/2022 by Dr. Ruvalcaba.  He underwent a successful wide circumferential PVI and empiric ablation of CTI.  Normal sinus and AV function post procedurally without evidence of dual AV ranjeet pathology or accessory pathway.  He was placed on Eliquis for minimum 2 months post procedure. The day postprocedure the patient had difficulty voiding and therefore was prescribed Flomax for 3 days.    Today the patient presents with his wife and young daughter who is 18 months old.  He states he has has been able to void without concerns " after starting the Flomax.  There was 1 day where he had some intermittent chest discomfort but this is resolved and he has no concerns with Eliquis.  Earlier today he was evaluated by Dr. Andrade and had an EKG that revealed normal sinus rhythm that will be scanned into her chart.          Assessment and Plan:     ASSESSMENT:    1. Symptomatic paroxysmal atrial fibrillation    Refractory to medical therapy (flecainide)    And previously did not tolerate AV ranjeet blocking agents    [id number] score 1 (diabetes)    Recommendation is to continue Eliquis for 2 months post procedure.    PLAN:     1. Continue Eliquis for total of 2 months post procedure  2. Follow-up with Dr. Ruvalcaba in January as previously scheduled       Orders this Visit:  Orders Placed This Encounter   Procedures     EKG 12-lead complete w/read - Clinics (performed today)     No orders of the defined types were placed in this encounter.    There are no discontinued medications.    Today's clinic visit entailed:  Review of prior external note(s) from - Outside records from Dr. Andrade  Review of the result(s) of each unique test - EKG, ablation  Assessment requiring an independent historian(s) - family - Wife  Ordering of each unique test  Prescription drug management  15 minutes spent on the date of the encounter doing chart review, history and exam, documentation and further activities per the note  Provider  Link to [facility] Help Grid     The level of medical decision making during this visit was of moderate complexity.           Review of Systems:     Review of Systems:  Skin:        Eyes:       ENT:       Respiratory:  Negative    Cardiovascular:  Negative;palpitations;dizziness;lightheadedness;edema;fatigue chest pain;Positive for  Gastroenterology:      Genitourinary:       Musculoskeletal:       Neurologic:       Psychiatric:       Heme/Lymph/Imm:       Endocrine:  Positive for thyroid disorder;diabetes            Physical Exam:     Vitals:  There were no vitals taken for this visit.  Constitutional: Well nourished and in no apparent distress.  Eyes: Pupils equal, round. Sclerae anicteric.   HEENT: Normocephalic, atraumatic.   Neck: Supple.  Respiratory: Breathing non-labored. Lungs clear to auscultation bilaterally. No crackles, wheezes, rhonchi, or rales.  Cardiovascular:  Regular rate and rhythm, normal S1 and S2. No murmur, rub, or gallop.  Skin: Warm, dry. No rashes, cyanosis, or xanthelasma.  Extremities: No edema. Groin site WNL  Neurologic: No gross motor deficits. Alert, awake, and oriented to person, place and time.  Psychiatric: Affect appropriate.        CURRENT MEDICATIONS:  Current Outpatient Medications   Medication Sig Dispense Refill     apixaban ANTICOAGULANT (ELIQUIS ANTICOAGULANT) 5 MG tablet Take 1 tablet (5 mg) by mouth 2 times daily 60 tablet 1     co-enzyme Q-10 30 MG CAPS capsule Take 30 mg by mouth       escitalopram (LEXAPRO) 10 MG tablet Take 10 mg by mouth daily       fish oil-omega-3 fatty acids 1000 MG capsule Take 1,000 Units by mouth       glucosamine-chondroitin 500-400 MG CAPS per capsule Take 2 capsules by mouth       insulin  UNIT/ML vial Inject 30 Units Subcutaneous 2 times daily       levothyroxine (SYNTHROID/LEVOTHROID) 125 MCG tablet Take 125 mcg by mouth       lisinopril (ZESTRIL) 40 MG tablet Take 40 mg by mouth daily       pioglitazone (ACTOS) 30 MG tablet TK 1 T PO QD FOR BS  0     pravastatin (PRAVACHOL) 40 MG tablet Take 40 mg by mouth daily       tadalafil (CIALIS) 2.5 MG tablet Take 2.5 mg by mouth daily       tamsulosin (FLOMAX) 0.4 MG capsule Take 1 capsule (0.4 mg) by mouth daily For 3 days 3 capsule 0     urea (CARMOL) 10 % cream Apply topically as needed for dry skin Put on hands and feet morning and evening 410 g 1     Vitamin D3 (CHOLECALCIFEROL) 125 MCG (5000 UT) tablet Take by mouth daily         ALLERGIES  Allergies   Allergen Reactions     Escitalopram Other (See Comments)     More  forgetful, Trouble focussing     Sertraline Other (See Comments)     Feels emotionless     Venlafaxine Analogues [Venlafaxine] Other (See Comments)     Intolerant, Feels tired, Can't concentrate         PAST MEDICAL HISTORY:  No past medical history on file.    PAST SURGICAL HISTORY:  Past Surgical History:   Procedure Laterality Date     EP ABLATION FOCAL AFIB N/A 10/31/2022    Procedure: Ablation Atrial Fibrilation;  Surgeon: Yahir Ruvalcaba MD;  Location:  HEART CARDI[facility] CATH LAB     OTHER SURGICAL HISTORY Bilateral     TN Tarsal tunnel release Feet       FAMILY HISTORY:  Family History   Problem Relation Age of Onset     Psoriasis Father      Skin Cancer No family hx of      Melanoma No family hx of      Dementia Father        SOCIAL HISTORY:  Social History     Socioeconomic History     Marital status:      Spouse name: None     Number of children: None     Years of education: None     Highest education level: None   Tobacco Use     Smoking status: Never     Smokeless tobacco: Never   Substance and Sexual Activity     Alcohol use: Yes     Comment: occasionally     Drug use: No

## 2022-11-07 NOTE — LETTER
"11/7/2022    Ledy Ma NP  [facility] [address]  [facility] [address]    RE: Chaitanya Davidson       Dear Colleague,     I had the pleasure of seeing Chaitanya Davidson in the [facility].    Electrophysiology Clinic Progress Note  Chaitanya Davidson MRN# [id number]   YOB: 1970 Age: 52 year old     Primary cardiologist: Dr. Ruvalcaba    Reason for visit: Follow up atrial fibrillation ablation    History of presenting illness:    Chaitanya Davidson is a pleasant 52 year old patient with past medical history significant for diabetes paroxysmal atrial fibrillation, and PACs,     He has a history of known atrial tachyarrhythmias and frequent PVCs and was evaluated in the emergency department a couple years ago.  Due to increasing episodes after having COVID-19 in January he was evaluated by Dr. Andrade.  His recent monitor showed PACs with a 16% burden and \"atrial flutter\" with a 1.1% burden with the longest episode lasting 22 minutes.  The patient has been intolerant to multiple beta-blockers and calcium channel blockers.    A previous echocardiogram that showed normal cardiac structure and function as well as a negative stress test was completed with Dr. Andrade's office but unfortunately I am unable to see.    He was referred to Dr. Ruvalcaba initially in July and discussed ablation.  He also placed him on flecainide.  Unfortunately, the patient called back after the visit did not tolerated and stated he did not tolerate it as it made him very irritable.  Dr. Ruvalcaba recommended that they proceed with an atrial fibrillation ablation with a CT prior.    The ablation was completed on 10/31/2022 by Dr. Ruvalcaba.  He underwent a successful wide circumferential PVI and empiric ablation of CTI.  Normal sinus and AV function post procedurally without evidence of dual AV ranjeet pathology or accessory pathway.  He was placed on Eliquis for minimum 2 months post procedure. The day " postprocedure the patient had difficulty voiding and therefore was prescribed Flomax for 3 days.    Today the patient presents with his wife and young daughter who is 18 months old.  He states he has has been able to void without concerns after starting the Flomax.  There was 1 day where he had some intermittent chest discomfort but this is resolved and he has no concerns with Eliquis.  Earlier today he was evaluated by Dr. Andrade and had an EKG that revealed normal sinus rhythm that will be scanned into her chart.          Assessment and Plan:     ASSESSMENT:    1. Symptomatic paroxysmal atrial fibrillation    Refractory to medical therapy (flecainide)    And previously did not tolerate AV ranjeet blocking agents    [id number] score 1 (diabetes)    Recommendation is to continue Eliquis for 2 months post procedure.    PLAN:     1. Continue Eliquis for total of 2 months post procedure  2. Follow-up with Dr. Ruvalcaba in January as previously scheduled       Orders this Visit:  Orders Placed This Encounter   Procedures     EKG 12-lead complete w/read - Clinics (performed today)     No orders of the defined types were placed in this encounter.    There are no discontinued medications.    Today's clinic visit entailed:  Review of prior external note(s) from - Outside records from Dr. Andrade  Review of the result(s) of each unique test - EKG, ablation  Assessment requiring an independent historian(s) - family - Wife  Ordering of each unique test  Prescription drug management  15 minutes spent on the date of the encounter doing chart review, history and exam, documentation and further activities per the note  Provider  Link to [facility] Help Grid     The level of medical decision making during this visit was of moderate complexity.           Review of Systems:     Review of Systems:  Skin:        Eyes:       ENT:       Respiratory:  Negative    Cardiovascular:  Negative;palpitations;dizziness;lightheadedness;edema;fatigue  chest pain;Positive for  Gastroenterology:      Genitourinary:       Musculoskeletal:       Neurologic:       Psychiatric:       Heme/Lymph/Imm:       Endocrine:  Positive for thyroid disorder;diabetes            Physical Exam:     Vitals: There were no vitals taken for this visit.  Constitutional: Well nourished and in no apparent distress.  Eyes: Pupils equal, round. Sclerae anicteric.   HEENT: Normocephalic, atraumatic.   Neck: Supple.  Respiratory: Breathing non-labored. Lungs clear to auscultation bilaterally. No crackles, wheezes, rhonchi, or rales.  Cardiovascular:  Regular rate and rhythm, normal S1 and S2. No murmur, rub, or gallop.  Skin: Warm, dry. No rashes, cyanosis, or xanthelasma.  Extremities: No edema. Groin site WNL  Neurologic: No gross motor deficits. Alert, awake, and oriented to person, place and time.  Psychiatric: Affect appropriate.        CURRENT MEDICATIONS:  Current Outpatient Medications   Medication Sig Dispense Refill     apixaban ANTICOAGULANT (ELIQUIS ANTICOAGULANT) 5 MG tablet Take 1 tablet (5 mg) by mouth 2 times daily 60 tablet 1     co-enzyme Q-10 30 MG CAPS capsule Take 30 mg by mouth       escitalopram (LEXAPRO) 10 MG tablet Take 10 mg by mouth daily       fish oil-omega-3 fatty acids 1000 MG capsule Take 1,000 Units by mouth       glucosamine-chondroitin 500-400 MG CAPS per capsule Take 2 capsules by mouth       insulin  UNIT/ML vial Inject 30 Units Subcutaneous 2 times daily       levothyroxine (SYNTHROID/LEVOTHROID) 125 MCG tablet Take 125 mcg by mouth       lisinopril (ZESTRIL) 40 MG tablet Take 40 mg by mouth daily       pioglitazone (ACTOS) 30 MG tablet TK 1 T PO QD FOR BS  0     pravastatin (PRAVACHOL) 40 MG tablet Take 40 mg by mouth daily       tadalafil (CIALIS) 2.5 MG tablet Take 2.5 mg by mouth daily       tamsulosin (FLOMAX) 0.4 MG capsule Take 1 capsule (0.4 mg) by mouth daily For 3 days 3 capsule 0     urea (CARMOL) 10 % cream Apply topically as needed for  dry skin Put on hands and feet morning and evening 410 g 1     Vitamin D3 (CHOLECALCIFEROL) 125 MCG (5000 UT) tablet Take by mouth daily         ALLERGIES  Allergies   Allergen Reactions     Escitalopram Other (See Comments)     More forgetful, Trouble focussing     Sertraline Other (See Comments)     Feels emotionless     Venlafaxine Analogues [Venlafaxine] Other (See Comments)     Intolerant, Feels tired, Can't concentrate         PAST MEDICAL HISTORY:  No past medical history on file.    PAST SURGICAL HISTORY:  Past Surgical History:   Procedure Laterality Date     EP ABLATION FOCAL AFIB N/A 10/31/2022    Procedure: Ablation Atrial Fibrilation;  Surgeon: Yahir Ruvalcaba MD;  Location:  HE[facility] CARDI[facility] CATH LAB     OTHER SURGICAL HISTORY Bilateral     [place] Tarsal tunnel release Feet       FAMILY HISTORY:  Family History   Problem Relation Age of Onset     Psoriasis Father      Skin Cancer No family hx of      Melanoma No family hx of      Dementia Father        SOCIAL HISTORY:  Social History     Socioeconomic History     Marital status:      Spouse name: None     Number of children: None     Years of education: None     Highest education level: None   Tobacco Use     Smoking status: Never     Smokeless tobacco: Never   Substance and Sexual Activity     Alcohol use: Yes     Comment: occasionally     Drug use: No         Thank you for allowing me to participate in the care of your patient.      Sincerely,     MARISSA PAGE, DARRYN [facility]  cc:   No referring provider defined for this encounter.

## 2023-10-04 ENCOUNTER — LAB REQUISITION (OUTPATIENT)
Dept: LAB | Facility: CLINIC | Age: 53
End: 2023-10-04

## 2023-10-04 PROCEDURE — 86038 ANTINUCLEAR ANTIBODIES: CPT | Performed by: STUDENT IN AN ORGANIZED HEALTH CARE EDUCATION/TRAINING PROGRAM

## 2023-10-05 ENCOUNTER — LAB REQUISITION (OUTPATIENT)
Dept: LAB | Facility: CLINIC | Age: 53
End: 2023-10-05

## 2023-10-05 PROCEDURE — 82977 ASSAY OF GGT: CPT | Performed by: STUDENT IN AN ORGANIZED HEALTH CARE EDUCATION/TRAINING PROGRAM

## 2023-10-06 LAB
ANA SER QL IF: NEGATIVE
GGT SERPL-CCNC: 40 U/L (ref 8–61)

## 2023-10-08 ENCOUNTER — HEALTH MAINTENANCE LETTER (OUTPATIENT)
Age: 53
End: 2023-10-08

## 2024-12-01 ENCOUNTER — HEALTH MAINTENANCE LETTER (OUTPATIENT)
Age: 54
End: 2024-12-01

## (undated) DEVICE — CATH SOUNDSTAR 8FRX90CM 10439011

## (undated) DEVICE — CATH RF CARD ABL BID JJ THERMO

## (undated) DEVICE — INTRODUCER SHEATH VASC CATH 8.5FR CARTO GIDE STH MED D138502

## (undated) DEVICE — CATH EP 7FR X 115CM DECANAV CA

## (undated) DEVICE — CATH EP CATH EP REPRO DAIG RSPN FX CRV DX EP C

## (undated) DEVICE — CATH NAV PENTARAY F CURVE

## (undated) DEVICE — INTRODUCER SHEATH FAST-CATH 9FRX12CM 406116

## (undated) DEVICE — TUBE SET SMARKABLATE IRRIGATION

## (undated) DEVICE — DEFIB PRO-PADZ LVP LQD GEL ADULT 8900-2105-01

## (undated) DEVICE — PATCH CARTO 3 EXTERNAL REFERENCE 3D MAPPING CREFP6

## (undated) DEVICE — NEEDLE TRANSSEPTAL 18GA X 98CM 86 DEG

## (undated) DEVICE — PACK EP SRG PROC LF DISP SAN32EPFSR

## (undated) RX ORDER — FENTANYL CITRATE 50 UG/ML
INJECTION, SOLUTION INTRAMUSCULAR; INTRAVENOUS
Status: DISPENSED
Start: 2022-10-31

## (undated) RX ORDER — HEPARIN SODIUM 1000 [USP'U]/ML
INJECTION, SOLUTION INTRAVENOUS; SUBCUTANEOUS
Status: DISPENSED
Start: 2022-10-31

## (undated) RX ORDER — HEPARIN SODIUM 200 [USP'U]/100ML
INJECTION, SOLUTION INTRAVENOUS
Status: DISPENSED
Start: 2022-10-31

## (undated) RX ORDER — LIDOCAINE HYDROCHLORIDE 10 MG/ML
INJECTION, SOLUTION EPIDURAL; INFILTRATION; INTRACAUDAL; PERINEURAL
Status: DISPENSED
Start: 2022-10-31

## (undated) RX ORDER — PROTAMINE SULFATE 10 MG/ML
INJECTION, SOLUTION INTRAVENOUS
Status: DISPENSED
Start: 2022-10-31

## (undated) RX ORDER — ACETAMINOPHEN 325 MG/1
TABLET ORAL
Status: DISPENSED
Start: 2022-10-31